# Patient Record
Sex: FEMALE | Race: WHITE | NOT HISPANIC OR LATINO | Employment: OTHER | URBAN - METROPOLITAN AREA
[De-identification: names, ages, dates, MRNs, and addresses within clinical notes are randomized per-mention and may not be internally consistent; named-entity substitution may affect disease eponyms.]

---

## 2023-08-13 ENCOUNTER — ANCILLARY PROCEDURE (OUTPATIENT)
Dept: GENERAL RADIOLOGY | Facility: CLINIC | Age: 63
End: 2023-08-13
Attending: NURSE PRACTITIONER
Payer: MEDICARE

## 2023-08-13 ENCOUNTER — OFFICE VISIT (OUTPATIENT)
Dept: URGENT CARE | Facility: URGENT CARE | Age: 63
End: 2023-08-13
Payer: MEDICARE

## 2023-08-13 VITALS
DIASTOLIC BLOOD PRESSURE: 73 MMHG | TEMPERATURE: 98.1 F | WEIGHT: 115 LBS | SYSTOLIC BLOOD PRESSURE: 108 MMHG | HEART RATE: 76 BPM | OXYGEN SATURATION: 100 %

## 2023-08-13 DIAGNOSIS — S69.91XA WRIST INJURY, RIGHT, INITIAL ENCOUNTER: ICD-10-CM

## 2023-08-13 DIAGNOSIS — S59.901A ELBOW INJURY, RIGHT, INITIAL ENCOUNTER: ICD-10-CM

## 2023-08-13 DIAGNOSIS — S52.124A CLOSED NONDISPLACED FRACTURE OF HEAD OF RIGHT RADIUS, INITIAL ENCOUNTER: Primary | ICD-10-CM

## 2023-08-13 PROCEDURE — 73080 X-RAY EXAM OF ELBOW: CPT | Mod: TC | Performed by: RADIOLOGY

## 2023-08-13 PROCEDURE — 99204 OFFICE O/P NEW MOD 45 MIN: CPT | Performed by: NURSE PRACTITIONER

## 2023-08-13 PROCEDURE — 73110 X-RAY EXAM OF WRIST: CPT | Mod: TC | Performed by: RADIOLOGY

## 2023-08-13 RX ORDER — ATORVASTATIN CALCIUM 20 MG/1
TABLET, FILM COATED ORAL
COMMUNITY
Start: 2022-08-01 | End: 2023-08-23

## 2023-08-14 ENCOUNTER — TELEPHONE (OUTPATIENT)
Dept: OTHER | Age: 63
End: 2023-08-14

## 2023-08-14 NOTE — TELEPHONE ENCOUNTER
SUBJECT:  RED FLAG FRACTURE TRIAGE NEEDED    Hello,  I'm with ortho con.  Pt has an arm fracture.  Should she see surgical or Nonsurgical.  She is open to [place].   Closed nondisplaced fracture of head of right radius .  Pt has had XR.  Please call the pt.

## 2023-08-16 DIAGNOSIS — M25.529 ELBOW PAIN: Primary | ICD-10-CM

## 2023-08-18 NOTE — TELEPHONE ENCOUNTER
DIAGNOSIS: Closed nondisplaced fracture of head of right radius/ Iwona Dee NP/ XR/ Medicare and / ortho con-Okay to use same day spot per TE    APPOINTMENT DATE: 08/21/23   NOTES STATUS DETAILS   DISCHARGE SUMMARY from hospital Internal 08/13/23 - German Dee, MELLY - [place] [facility]   MEDICATION LIST Internal    XRAYS (IMAGES & REPORTS) Internal XR R Elbow, XR W Wrist:  - 08/13/23 - German Dee, NP - [place]

## 2023-08-21 ENCOUNTER — PRE VISIT (OUTPATIENT)
Dept: ORTHOPEDICS | Facility: CLINIC | Age: 63
End: 2023-08-21

## 2023-08-21 ENCOUNTER — ANCILLARY PROCEDURE (OUTPATIENT)
Dept: GENERAL RADIOLOGY | Facility: CLINIC | Age: 63
End: 2023-08-21
Attending: FAMILY MEDICINE
Payer: MEDICARE

## 2023-08-21 ENCOUNTER — OFFICE VISIT (OUTPATIENT)
Dept: ORTHOPEDICS | Facility: CLINIC | Age: 63
End: 2023-08-21
Payer: MEDICARE

## 2023-08-21 DIAGNOSIS — M25.421 ELBOW EFFUSION, RIGHT: Primary | ICD-10-CM

## 2023-08-21 DIAGNOSIS — M25.529 ELBOW PAIN: ICD-10-CM

## 2023-08-21 DIAGNOSIS — S52.124A CLOSED NONDISPLACED FRACTURE OF HEAD OF RIGHT RADIUS, INITIAL ENCOUNTER: ICD-10-CM

## 2023-08-21 PROCEDURE — 73080 X-RAY EXAM OF ELBOW: CPT | Mod: RT | Performed by: RADIOLOGY

## 2023-08-21 PROCEDURE — 99203 OFFICE O/P NEW LOW 30 MIN: CPT | Performed by: FAMILY MEDICINE

## 2023-08-21 NOTE — PROGRESS NOTES
Patient evaluated in urgent care 8/13/2023, after a fall onto an outstretched right arm that occurred 2 days prior, 8/11/2023, ten days ago.  X-ray of the right wrist and elbow were negative for fracture but left elbow showed an intra-articular effusion suggestive of the possibility of an occult radial head fracture.  Patient was given a sling and told to follow-up with orthopedics. She is alternating between ibuprofen and Tylenol for her pain. She has remained in her sling. She is right hand dominant.         Imaging studies below reviewed by me:  EXAM: XR ELBOW RIGHT G/E 3 VIEWS  LOCATION: [facility]  DATE: 8/13/2023     INDICATION: FOOSH with pain bruising limited range of motion  COMPARISON: None.                                                                      IMPRESSION: No fracture or dislocation. Moderate-sized joint effusion.      EXAM: XR WRIST RIGHT G/E 3 VIEWS  LOCATION: [facility]  DATE: 8/13/2023     INDICATION: FOOSH with pain bruising limited range of motion  COMPARISON: None.                                                                      IMPRESSION: No fracture. Mild ulnar minus variance. Normal carpal alignment.        PMH:  hypercholesterolemia    Active problem list:  There is no problem list on file for this patient.      FH:  No family history on file.    SH:  Social History     Socioeconomic History    Marital status:      Spouse name: Not on file    Number of children: Not on file    Years of education: Not on file    Highest education level: Not on file   Occupational History    Not on file   Tobacco Use    Smoking status: Never     Passive exposure: Never    Smokeless tobacco: Never   Substance and Sexual Activity    Alcohol use: Not on file    Drug use: Not on file    Sexual activity: Not on file   Other Topics Concern    Not on file   Social History Narrative    Not on file     Social Determinants of Health      Financial Resource Strain: Not on file   Food Insecurity: Not on file   Transportation Needs: Not on file   Physical Activity: Not on file   Stress: Not on file   Social Connections: Not on file   Intimate Partner Violence: Not on file   Housing Stability: Not on file       MEDS:  See EMR, reviewed    atorvastatin (LIPITOR) 20 MG tablet,     ALL:  See EMR, reviewed    REVIEW OF SYSTEMS:  CONSTITUTIONAL:NEGATIVE for fever, chills, change in weight  INTEGUMENTARY/SKIN: NEGATIVE for worrisome rashes, moles or lesions  EYES: NEGATIVE for vision changes or irritation  ENT/MOUTH: NEGATIVE for ear, mouth and throat problems  RESP:NEGATIVE for significant cough or SOB  BREAST: NEGATIVE for masses, tenderness or discharge  CV: NEGATIVE for chest pain, palpitations or peripheral edema  GI: NEGATIVE for nausea, abdominal pain, heartburn, or change in bowel habits  :NEGATIVE for frequency, dysuria, or hematuria  :NEGATIVE for frequency, dysuria, or hematuria  NEURO: NEGATIVE for weakness, dizziness or paresthesias  ENDOCRINE: NEGATIVE for temperature intolerance, skin/hair changes  HEME/ALLERGY/IMMUNE: NEGATIVE for bleeding problems  PSYCHIATRIC: NEGATIVE for changes in mood or affect          Objective: She has full strength at the right shoulder with no signs of rotator cuff weakness.  She has no tenderness at the right wrist at the distal radius, distal ulna, scaphoid or lunate.  Grasp strength is strong in the right hand.  Sensation is normal distally.  She is tender at the proximal radius of the elbow.  She can flex and extend the elbow through full range of motion.  Supination and pronation are tolerated with discomfort at the extremes of motion.  Appropriate in conversation and affect.    I personally reviewed with the patient updated x-rays of the elbow that show signs of an elbow effusion but no obvious radial head fracture.  Tiny marzena calcification at the tip of the coronoid process, but not diagnostic for  fracture.      Assessment: Acute right-sided elbow injury suspect occult radial head fracture, x10 days    Plan: Sling as needed for comfort.  We discussed removing the sling each day for range of motion through flexion and extension.  After 2 weeks she can try range of motion additionally in pronation and supination.  She will avoid direct weightbearing, pushing and pulling with the right elbow over the next 3 weeks.  Tylenol as needed for pain.  Follow-up x-ray of the right elbow and clinical exam in 3 weeks.

## 2023-08-23 ENCOUNTER — OFFICE VISIT (OUTPATIENT)
Dept: FAMILY MEDICINE | Facility: CLINIC | Age: 63
End: 2023-08-23
Payer: MEDICARE

## 2023-08-23 ENCOUNTER — LAB (OUTPATIENT)
Dept: FAMILY MEDICINE | Facility: CLINIC | Age: 63
End: 2023-08-23

## 2023-08-23 VITALS
WEIGHT: 113 LBS | DIASTOLIC BLOOD PRESSURE: 67 MMHG | TEMPERATURE: 97.6 F | HEIGHT: 62 IN | OXYGEN SATURATION: 98 % | BODY MASS INDEX: 20.8 KG/M2 | SYSTOLIC BLOOD PRESSURE: 99 MMHG | RESPIRATION RATE: 15 BRPM | HEART RATE: 76 BPM

## 2023-08-23 DIAGNOSIS — Z12.11 SCREEN FOR COLON CANCER: ICD-10-CM

## 2023-08-23 DIAGNOSIS — Z11.4 SCREENING FOR HIV (HUMAN IMMUNODEFICIENCY VIRUS): ICD-10-CM

## 2023-08-23 DIAGNOSIS — Z00.00 ENCOUNTER FOR MEDICARE ANNUAL WELLNESS EXAM: Primary | ICD-10-CM

## 2023-08-23 DIAGNOSIS — Z23 ENCOUNTER FOR IMMUNIZATION: ICD-10-CM

## 2023-08-23 DIAGNOSIS — Z86.73 H/O: CVA (CEREBROVASCULAR ACCIDENT): ICD-10-CM

## 2023-08-23 DIAGNOSIS — R29.898 WEAKNESS OF RIGHT HIP: ICD-10-CM

## 2023-08-23 PROBLEM — I63.9 CVA (CEREBRAL VASCULAR ACCIDENT) (H): Status: ACTIVE | Noted: 2019-08-14

## 2023-08-23 PROBLEM — M81.0 OSTEOPOROSIS: Status: ACTIVE | Noted: 2017-03-23

## 2023-08-23 LAB
ANION GAP SERPL CALCULATED.3IONS-SCNC: 10 MMOL/L (ref 7–15)
BUN SERPL-MCNC: 11.1 MG/DL (ref 8–23)
CALCIUM SERPL-MCNC: 9.6 MG/DL (ref 8.8–10.2)
CHLORIDE SERPL-SCNC: 105 MMOL/L (ref 98–107)
CHOLEST SERPL-MCNC: 165 MG/DL
CREAT SERPL-MCNC: 0.64 MG/DL (ref 0.51–0.95)
DEPRECATED HCO3 PLAS-SCNC: 26 MMOL/L (ref 22–29)
ERYTHROCYTE [DISTWIDTH] IN BLOOD BY AUTOMATED COUNT: 13.7 % (ref 10–15)
GFR SERPL CREATININE-BSD FRML MDRD: >90 ML/MIN/1.73M2
GLUCOSE SERPL-MCNC: 92 MG/DL (ref 70–99)
HCT VFR BLD AUTO: 38.7 % (ref 35–47)
HDLC SERPL-MCNC: 79 MG/DL
HGB BLD-MCNC: 12.4 G/DL (ref 11.7–15.7)
LDLC SERPL CALC-MCNC: 73 MG/DL
MCH RBC QN AUTO: 30 PG (ref 26.5–33)
MCHC RBC AUTO-ENTMCNC: 32 G/DL (ref 31.5–36.5)
MCV RBC AUTO: 94 FL (ref 78–100)
NONHDLC SERPL-MCNC: 86 MG/DL
PLATELET # BLD AUTO: 268 10E3/UL (ref 150–450)
POTASSIUM SERPL-SCNC: 4.7 MMOL/L (ref 3.4–5.3)
RBC # BLD AUTO: 4.13 10E6/UL (ref 3.8–5.2)
SODIUM SERPL-SCNC: 141 MMOL/L (ref 136–145)
TRIGL SERPL-MCNC: 66 MG/DL
WBC # BLD AUTO: 5.4 10E3/UL (ref 4–11)

## 2023-08-23 PROCEDURE — 90677 PCV20 VACCINE IM: CPT | Performed by: PHYSICIAN ASSISTANT

## 2023-08-23 PROCEDURE — 36415 COLL VENOUS BLD VENIPUNCTURE: CPT | Performed by: PHYSICIAN ASSISTANT

## 2023-08-23 PROCEDURE — G0009 ADMIN PNEUMOCOCCAL VACCINE: HCPCS | Performed by: PHYSICIAN ASSISTANT

## 2023-08-23 PROCEDURE — 85027 COMPLETE CBC AUTOMATED: CPT | Performed by: PHYSICIAN ASSISTANT

## 2023-08-23 PROCEDURE — 80061 LIPID PANEL: CPT | Performed by: PHYSICIAN ASSISTANT

## 2023-08-23 PROCEDURE — 80048 BASIC METABOLIC PNL TOTAL CA: CPT | Performed by: PHYSICIAN ASSISTANT

## 2023-08-23 PROCEDURE — 87389 HIV-1 AG W/HIV-1&-2 AB AG IA: CPT | Performed by: PHYSICIAN ASSISTANT

## 2023-08-23 PROCEDURE — 99213 OFFICE O/P EST LOW 20 MIN: CPT | Mod: 25 | Performed by: PHYSICIAN ASSISTANT

## 2023-08-23 PROCEDURE — G0402 INITIAL PREVENTIVE EXAM: HCPCS | Performed by: PHYSICIAN ASSISTANT

## 2023-08-23 RX ORDER — ATORVASTATIN CALCIUM 20 MG/1
20 TABLET, FILM COATED ORAL DAILY
Qty: 90 TABLET | Refills: 3 | Status: SHIPPED | OUTPATIENT
Start: 2023-08-23 | End: 2024-02-12

## 2023-08-23 ASSESSMENT — ENCOUNTER SYMPTOMS
FEVER: 0
MYALGIAS: 0
JOINT SWELLING: 0
DYSURIA: 0
NAUSEA: 0
DIZZINESS: 0
SHORTNESS OF BREATH: 0
HEARTBURN: 0
PALPITATIONS: 0
CONSTIPATION: 0
HEMATOCHEZIA: 0
EYE PAIN: 0
ABDOMINAL PAIN: 0
HEMATURIA: 0
CHILLS: 0
SORE THROAT: 0
ARTHRALGIAS: 0
COUGH: 0
PARESTHESIAS: 0
FREQUENCY: 0
DIARRHEA: 0
WEAKNESS: 0
BREAST MASS: 0
NERVOUS/ANXIOUS: 0
HEADACHES: 0

## 2023-08-23 ASSESSMENT — ACTIVITIES OF DAILY LIVING (ADL): CURRENT_FUNCTION: NO ASSISTANCE NEEDED

## 2023-08-23 ASSESSMENT — PAIN SCALES - GENERAL: PAINLEVEL: NO PAIN (0)

## 2023-08-23 NOTE — PROGRESS NOTES
"SUBJECTIVE:   Gladis is a 63 year old who presents for Preventive Visit.      8/23/2023    10:51 AM   Additional Questions   Roomed by Bailee Soto     Are you in the first 12 months of your Medicare coverage?  Yes,  Visual Acuity:  Right Eye: 20/20   Left Eye: 20/20  Both Eyes: 20/20    Healthy Habits:     In general, how would you rate your overall health?  Good    Frequency of exercise:  4-5 days/week    Duration of exercise:  30-45 minutes    Do you usually eat at least 4 servings of fruit and vegetables a day, include whole grains    & fiber and avoid regularly eating high fat or \"junk\" foods?  Yes    Taking medications regularly:  Yes    Medication side effects:  None    Ability to successfully perform activities of daily living:  No assistance needed    Home Safety:  No safety concerns identified    Hearing Impairment:  No hearing concerns    In the past 6 months, have you been bothered by leaking of urine?  No    In general, how would you rate your overall mental or emotional health?  Good    Additional concerns today:  Yes    Gladis here today to establish care, first medicare wellness    Moved to [facility] to be closer to her kids and grandkids from [place]  Records available via care everywhere  Due for refills of atorvastatin    She does also mention some deep pain/catching of right hip with certain positional changes, ongoing for quite some time    Have you ever done Advance Care Planning? (For example, a Health Directive, POLST, or a discussion with a medical provider or your loved ones about your wishes): No, advance care planning information given to patient to review.  Patient declined advance care planning discussion at this time.     Fall risk  Fallen 2 or more times in the past year?: No  Any fall with injury in the past year?: Yes    Cognitive Screening   1) Repeat 3 items (Leader, Season, Table)    2) Clock draw: NORMAL  3) 3 item recall: Recalls 2 objects   Results: NORMAL clock, 1-2 items recalled: " COGNITIVE IMPAIRMENT LESS LIKELY    Mini-CogTM Copyright JEREMÍAS Estrada. Licensed by the author for use in [facility]; reprinted with permission (monae@.[facility]). All rights reserved.      Reviewed and updated as needed this visit by clinical staff   Tobacco  Allergies  Meds  Problems  Med Hx  Surg Hx  Fam Hx          Reviewed and updated as needed this visit by Provider   Tobacco   Meds  Problems  Med Hx  Surg Hx  Fam Hx         Social History     Tobacco Use    Smoking status: Never     Passive exposure: Never    Smokeless tobacco: Never   Substance Use Topics    Alcohol use: Not Currently           8/23/2023    10:08 AM   Alcohol Use   Prescreen: >3 drinks/day or >7 drinks/week? Not Applicable          No data to display              Do you have a current opioid prescription? No  Do you use any other controlled substances or medications that are not prescribed by a provider? None      Current providers sharing in care for this patient include:   Patient Care Team:  Bailee Sands PA-C as PCP - General (Physician Assistant - Medical)    The following health maintenance items are reviewed in Epic and correct as of today:  Health Maintenance   Topic Date Due    COLORECTAL CANCER SCREENING  Never done    HIV SCREENING  Never done    LIPID  Never done    ZOSTER IMMUNIZATION (1 of 2) Never done    COVID-19 Vaccine (4 - Pfizer series) 12/27/2021    MEDICARE ANNUAL WELLNESS VISIT  08/12/2023    INFLUENZA VACCINE (1) 09/01/2023    ANNUAL REVIEW OF HM ORDERS  08/23/2024    FALL RISK ASSESSMENT  08/23/2024    MAMMO SCREENING  11/30/2024    ADVANCE CARE PLANNING  08/23/2028    DTAP/TDAP/TD IMMUNIZATION (3 - Td or Tdap) 03/18/2029    DEXA  04/02/2034    PHQ-2 (once per calendar year)  Completed    Pneumococcal Vaccine: 65+ Years  Completed    IPV IMMUNIZATION  Aged Out    MENINGITIS IMMUNIZATION  Aged Out    HEPATITIS C SCREENING  Discontinued     FHS-7:       8/23/2023    10:09 AM   Breast CA Risk Assessment  "(FHS-7)   Did any of your first-degree relatives have breast or ovarian cancer? Yes   Did any of your relatives have bilateral breast cancer? No   Did any man in your family have breast cancer? No   Did any woman in your family have breast and ovarian cancer? Yes   Did any woman in your family have breast cancer before age 50 y? No   Do you have 2 or more relatives with breast and/or ovarian cancer? No   Do you have 2 or more relatives with breast and/or bowel cancer? No     Mammogram Screening: Recommended mammography every 1-2 years with patient discussion and risk factor consideration  Pertinent mammograms are reviewed under the imaging tab.    Review of Systems   Constitutional:  Negative for chills and fever.   HENT:  Negative for congestion, ear pain, hearing loss and sore throat.    Eyes:  Negative for pain and visual disturbance.   Respiratory:  Negative for cough and shortness of breath.    Cardiovascular:  Negative for chest pain, palpitations and peripheral edema.   Gastrointestinal:  Negative for abdominal pain, constipation, diarrhea, heartburn, hematochezia and nausea.   Breasts:  Negative for tenderness, breast mass and discharge.   Genitourinary:  Negative for dysuria, frequency, genital sores, hematuria, pelvic pain, urgency, vaginal bleeding and vaginal discharge.   Musculoskeletal:  Negative for arthralgias, joint swelling and myalgias.   Skin:  Negative for rash.   Neurological:  Negative for dizziness, weakness, headaches and paresthesias.   Psychiatric/Behavioral:  Positive for mood changes. The patient is not nervous/anxious.        OBJECTIVE:   BP 99/67 (BP Location: Right arm, Patient Position: Sitting, Cuff Size: Adult Regular)   Pulse 76   Temp 97.6  F (36.4  C) (Temporal)   Resp 15   Ht 1.562 m (5' 1.5\")   Wt 51.3 kg (113 lb)   SpO2 98%   BMI 21.01 kg/m   Estimated body mass index is 21.01 kg/m  as calculated from the following:    Height as of this encounter: 1.562 m (5' 1.5\").    " Weight as of this encounter: 51.3 kg (113 lb).  Physical Exam  GENERAL: healthy, alert and no distress  EYES: Eyes grossly normal to inspection, PERRL and conjunctivae and sclerae normal  HENT: ear canals and TM's normal, nose and mouth without ulcers or lesions  NECK: no adenopathy, no asymmetry, masses, or scars and thyroid normal to palpation  RESP: lungs clear to auscultation - no rales, rhonchi or wheezes  CV: regular rate and rhythm, normal S1 S2, no S3 or S4, no murmur, click or rub, no peripheral edema and peripheral pulses strong  ABDOMEN: soft, nontender, no hepatosplenomegaly, no masses and bowel sounds normal  MS: no gross musculoskeletal defects noted, no edema  SKIN: no suspicious lesions or rashes  NEURO: Normal strength and tone, mentation intact and speech normal  PSYCH: mentation appears normal, affect normal/bright      ASSESSMENT / PLAN:   Gladis was seen today for annual visit.    Diagnoses and all orders for this visit:    Encounter for Medicare annual wellness exam  -     REVIEW OF HEALTH MAINTENANCE PROTOCOL ORDERS  -     CBC with platelets; Future  -     Basic metabolic panel; Future  -     Lipid panel reflex to direct LDL Non-fasting; Future  -     CBC with platelets  -     Basic metabolic panel  -     Lipid panel reflex to direct LDL Non-fasting    Weakness of right hip - recommend PT, she is agreeable, referral provided  -     Physical Therapy Referral; Future    H/O: CVA (cerebrovascular accident) - refills sent  -     atorvastatin (LIPITOR) 20 MG tablet; Take 1 tablet (20 mg) by mouth daily    Screen for colon cancer  -     COLOGUARD(EXACT SCIENCES); Future    Screening for HIV (human immunodeficiency virus)  -     HIV Antigen Antibody Combo; Future  -     HIV Antigen Antibody Combo    Encounter for immunization  -     Pneumococcal 20 Valent Conjugate (PCV20)    Other orders  -     Cancel: MA SCREENING DIGITAL BILAT - Future  (s+30); Future      COUNSELING:  Reviewed preventive health  counseling, as reflected in patient instructions        She reports that she has never smoked. She has never been exposed to tobacco smoke. She has never used smokeless tobacco.      Appropriate preventive services were discussed with this patient, including applicable screening as appropriate for cardiovascular disease, diabetes, osteopenia/osteoporosis, and glaucoma.  As appropriate for age/gender, discussed screening for colorectal cancer, prostate cancer, breast cancer, and cervical cancer. Checklist reviewing preventive services available has been given to the patient.    Reviewed patients plan of care and provided an AVS. The Basic Care Plan (routine screening as documented in Health Maintenance) for Gladis meets the Care Plan requirement. This Care Plan has been established and reviewed with the Patient.          Bailee Sands PA-C  [facility]    Identified Health Risks:  I have reviewed Opioid Use Disorder and Substance Use Disorder risk factors and made any needed referrals.

## 2023-08-23 NOTE — NURSING NOTE
Prior to immunization administration, verified patients identity using patient s name and date of birth. Please see Immunization Activity for additional information.     Screening Questionnaire for Adult Immunization    Are you sick today?   No   Do you have allergies to medications, food, a vaccine component or latex?   Yes   Have you ever had a serious reaction after receiving a vaccination?   No   Do you have a long-term health problem with heart, lung, kidney, or metabolic disease (e.g., diabetes), asthma, a blood disorder, no spleen, complement component deficiency, a cochlear implant, or a spinal fluid leak?  Are you on long-term aspirin therapy?   No   Do you have cancer, leukemia, HIV/AIDS, or any other immune system problem?   No   Do you have a parent, brother, or sister with an immune system problem?   No   In the past 3 months, have you taken medications that affect  your immune system, such as prednisone, other steroids, or anticancer drugs; drugs for the treatment of rheumatoid arthritis, Crohn s disease, or psoriasis; or have you had radiation treatments?   No   Have you had a seizure, or a brain or other nervous system problem?   No   During the past year, have you received a transfusion of blood or blood    products, or been given immune (gamma) globulin or antiviral drug?   No   For women: Are you pregnant or is there a chance you could become       pregnant during the next month?   No   Have you received any vaccinations in the past 4 weeks?   No     Immunization questionnaire was positive for at least one answer.  Notified miguel.      Patient instructed to remain in clinic for 15 minutes afterwards, and to report any adverse reactions.     Screening performed by Marry Toney MA on 8/23/2023 at 11:31 AM.

## 2023-08-24 LAB — HIV 1+2 AB+HIV1 P24 AG SERPL QL IA: NONREACTIVE

## 2023-09-06 DIAGNOSIS — M25.529 ELBOW PAIN: Primary | ICD-10-CM

## 2023-09-10 ASSESSMENT — ACTIVITIES OF DAILY LIVING (ADL)
STEPPING UP AND DOWN CURBS: SLIGHT DIFFICULTY
HOS_ADL_HIGHEST_POTENTIAL_SCORE: 48
ROLLING OVER IN BED: SLIGHT DIFFICULTY
HOW_WOULD_YOU_RATE_YOUR_CURRENT_LEVEL_OF_FUNCTION_DURING_YOUR_USUAL_ACTIVITIES_OF_DAILY_LIVING_FROM_0_TO_100_WITH_100_BEING_YOUR_LEVEL_OF_FUNCTION_PRIOR_TO_YOUR_HIP_PROBLEM_AND_0_BEING_THE_INABILITY_TO_PERFORM_ANY_OF_YOUR_USUAL_DAILY_ACTIVITIES?: 90
HEAVY_WORK: SLIGHT DIFFICULTY
TWISTING/PIVOTING ON INVOLVED LEG: MODERATE DIFFICULTY
HOS_ADL_ITEM_SCORE_TOTAL: 39
LIGHT_TO_MODERATE_WORK: SLIGHT DIFFICULTY
RECREATIONAL ACTIVITIES: SLIGHT DIFFICULTY
GOING UP 1 FLIGHT OF STAIRS: NO DIFFICULTY AT ALL
WALKING_UP_STEEP_HILLS: NO DIFFICULTY AT ALL
WALKING_DOWN_STEEP_HILLS: NO DIFFICULTY AT ALL
DEEP SQUATTING: SLIGHT DIFFICULTY
PUTTING ON SOCKS AND SHOES: NO DIFFICULTY AT ALL
STANDING FOR 15 MINUTES: NO DIFFICULTY AT ALL
GETTING INTO AND OUT OF AN AVERAGE CAR: SLIGHT DIFFICULTY

## 2023-09-11 ENCOUNTER — THERAPY VISIT (OUTPATIENT)
Dept: PHYSICAL THERAPY | Facility: CLINIC | Age: 63
End: 2023-09-11
Payer: MEDICARE

## 2023-09-11 DIAGNOSIS — R29.898 WEAKNESS OF RIGHT HIP: ICD-10-CM

## 2023-09-11 PROCEDURE — 97161 PT EVAL LOW COMPLEX 20 MIN: CPT | Mod: GP

## 2023-09-11 PROCEDURE — 97110 THERAPEUTIC EXERCISES: CPT | Mod: GP

## 2023-09-11 NOTE — PROGRESS NOTES
PHYSICAL THERAPY EVALUATION  Type of Visit: Evaluation    See electronic medical record for Abuse and Falls Screening details.    Subjective   1.5 years ago getting pain in anterolateral R hip. She notes tightness and swelling in R hip when walking. She notes she was squatting and doing yoga with benefit. She notes symptoms developed feelings that her leg will give out. She generally is without pain, but with certain stepping patterns such as getting out of her car, stepping up on a curb, or turning while walking. Nature of symptoms is intermittent (once every two weeks), sharp, catch, and non-radiating. 6/10 pain. She notes being more careful these days. She notes history of stroke on R side in 2019.       Presenting condition or subjective complaint: I have experienced sporadic pain when pivoting, stepping, or simply walking on my right side, causing me to feel like my leg is going to give out when I put pressure on it or move slightly .  Date of onset: 21 (Pain for 1.5 years - CVA 3 years ago)    Relevant medical history: Stroke   Dates & types of surgery:  section, 1984, 10/13/1986    Prior diagnostic imaging/testing results:       Prior therapy history for the same diagnosis, illness or injury: No      Living Environment  Social support: With a significant other or spouse   Type of home: 2-story   Stairs to enter the home: Yes 5 Is there a railing: No   Ramp:     Stairs inside the home: Yes   Is there a railing: Yes   Help at home: None  Equipment owned:       Employment: No    Hobbies/Interests: Walking, reading, cooking    Patient goals for therapy: Carry my grandchild safely up and down stairs, safely do squatting exercises and bend and reach for normal daily activities.       Objective   HIP EVALUATION  POSTURE: Standing Posture: Sway back  GAIT: Noted trendelenburg sign on R with increased R lateral shifting  ROM:   (Degrees) Left AROM Left PROM  Right AROM Right PROM   Hip Flexion  Minimal change  Minimal change    Hip Internal Rotation  25  18   Hip External Rotation  45  50   Lumbar Rotation Restricted with R hip tension    Lumbar Flexion Fingertips to top of toes   Lumbar Extension Stretch in R hip   Pain:   End feel:   STRENGTH:  Global R sided weakness compared to L sided (history of R side affected CVA)  PALPATION:  Tenderness to palpation of R hip flexors, R rectus femoris, R greater trochanter  JOINT MOBILITY: Decreased B hip IR     Assessment & Plan   CLINICAL IMPRESSIONS  Medical Diagnosis: Weakness of right hip    Treatment Diagnosis: R hip pain with strength and movement coordination impairments.   Impression/Assessment: Patient is a 63 year old female with R hip pain complaints.  The following significant findings have been identified: Pain, Decreased ROM/flexibility, Decreased joint mobility, Decreased strength, Impaired balance, Decreased proprioception, and Impaired gait. These impairments interfere with their ability to perform self care tasks, recreational activities, and household chores as compared to previous level of function.     Clinical Decision Making (Complexity):  Clinical Presentation: Stable/Uncomplicated  Clinical Presentation Rationale: based on medical and personal factors listed in PT evaluation  Clinical Decision Making (Complexity): Low complexity    PLAN OF CARE  Treatment Interventions:  Interventions: Gait Training, Manual Therapy, Neuromuscular Re-education, Therapeutic Activity, Therapeutic Exercise, Self-Care/Home Management    Long Term Goals     PT Goal 1  Goal Identifier: Lifting  Goal Description: Pt will demonstrate ability to carry 20 pounds for 50 feet with minimal to no symptoms in R hip to demonstrate tolerance for carrying grandkids without symptoms.  Rationale: to maximize safety and independence with performance of ADLs and functional tasks  Target Date: 12/10/23      Frequency of Treatment: 1x/week  Duration of Treatment: 12  weeks    Education Assessment:   Learner/Method: Patient;Demonstration;Pictures/Video;No Barriers to Learning    Risks and benefits of evaluation/treatment have been explained.   Patient/Family/caregiver agrees with Plan of Care.     Evaluation Time:     PT [place], Low Complexity Minutes (88008): 15     Signing Clinician: LOLA COSTA [facility]                                                                                   OUTPATIENT PHYSICAL THERAPY      PLAN OF TREATMENT FOR OUTPATIENT REHABILITATION   Patient's Last Name, First Name, Gladis Savage YOB: 1960   Provider's Name   [facility]   Medical Record No.  [id number]     Onset Date: 09/11/23 (Pain for 1.5 years - CVA 3 years ago)  Start of Care Date: 09/11/23     Medical Diagnosis:  Weakness of right hip      PT Treatment Diagnosis:  R hip pain with strength and movement coordination impairments. Plan of Treatment  Frequency/Duration: 1x/week/ 12 weeks    Certification date from 09/11/23 to 12/10/23         See note for plan of treatment details and functional goals     LOLA DORAN                         I CERTIFY THE NEED FOR THESE SERVICES FURNISHED UNDER        THIS PLAN OF TREATMENT AND WHILE UNDER MY CARE     (Physician attestation of this document indicates review and certification of the therapy plan).                Referring Provider:  Bailee Sands      Initial Assessment  See Epic Evaluation- Start of Care Date: 09/11/23

## 2023-09-13 ENCOUNTER — ANCILLARY PROCEDURE (OUTPATIENT)
Dept: GENERAL RADIOLOGY | Facility: CLINIC | Age: 63
End: 2023-09-13
Attending: FAMILY MEDICINE
Payer: MEDICARE

## 2023-09-13 ENCOUNTER — OFFICE VISIT (OUTPATIENT)
Dept: ORTHOPEDICS | Facility: CLINIC | Age: 63
End: 2023-09-13
Payer: MEDICARE

## 2023-09-13 DIAGNOSIS — M25.529 ELBOW PAIN: ICD-10-CM

## 2023-09-13 DIAGNOSIS — S52.124D: Primary | ICD-10-CM

## 2023-09-13 PROCEDURE — 99213 OFFICE O/P EST LOW 20 MIN: CPT | Performed by: FAMILY MEDICINE

## 2023-09-13 PROCEDURE — 73080 X-RAY EXAM OF ELBOW: CPT | Mod: RT | Performed by: RADIOLOGY

## 2023-09-13 NOTE — LETTER
9/13/2023      RE: Gladis Dunaway  [address] [place] [address]     Dear Colleague,    Thank you for referring your patient, Gladis Dunaway, to the [facility] [place]. Please see a copy of my visit note below.    Follow-up right-sided elbow injury with suspected occult right radial head fracture, negative x-rays, that occurred 1 month ago.    Patient indicates that since her last visit her pain is resolved.  She does not notice discomfort with movements of the elbow.    Date of injury: Patient fell onto outstretched right upper extremity 8/11/2023.   elbow xray showed an intra-articular effusion suggestive of the possibility of an occult radial head fracture .    Today, the patient reports that she has improved since her last visit. She denies any current pain. 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     Exam: 4 views of the right elbow dated 8/21/2023.     COMPARISON: 8/13/2023.     CLINICAL HISTORY: Pain.     FINDINGS: AP, oblique, and lateral views of the right elbow were  obtained. Joint effusion. No displaced fracture. Slight irregularity  at the radial head and neck junction.                                                                      IMPRESSION: Slight irregularity at the radial head and neck junction,  correlate clinically for a nondisplaced fracture.     JANNET GODOY MD         EXAM: XR WRIST RIGHT G/E 3 VIEWS  LOCATION: [facility]  DATE: 8/13/2023     INDICATION: FOOSH with pain bruising limited range of motion  COMPARISON: None.                                                                      IMPRESSION: No fracture. Mild ulnar minus variance. Normal carpal alignment.        PMH:  Past Medical History:   Diagnosis Date    Cerebral infarction (H) August 2019    Osteoporosis        Active problem list:  Patient Active Problem List   Diagnosis    Allergic rhinitis    H/O: CVA (cerebrovascular accident)     Hypercholesterolemia    Osteoporosis    Perianal abscess    Weakness of right hip       FH:  Family History   Problem Relation Age of Onset    Breast Cancer Mother         dx in 60's    Anxiety Disorder Mother     Coronary Artery Disease Father     Hyperlipidemia Father        SH:  Social History     Socioeconomic History    Marital status:      Spouse name: Not on file    Number of children: Not on file    Years of education: Not on file    Highest education level: Not on file   Occupational History    Not on file   Tobacco Use    Smoking status: Never     Passive exposure: Never    Smokeless tobacco: Never   Vaping Use    Vaping Use: Never used   Substance and Sexual Activity    Alcohol use: Not Currently    Drug use: Never    Sexual activity: Not Currently     Partners: Male     Birth control/protection: Post-menopausal   Other Topics Concern    Parent/sibling w/ CABG, MI or angioplasty before 65F 55M? No   Social History Narrative    Not on file     Social Determinants of Health     Financial Resource Strain: Not on file   Food Insecurity: Not on file   Transportation Needs: Not on file   Physical Activity: Not on file   Stress: Not on file   Social Connections: Not on file   Intimate Partner Violence: Not on file   Housing Stability: Not on file       MEDS:  See EMR, reviewed  ALL:  See EMR, reviewed    REVIEW OF SYSTEMS:  CONSTITUTIONAL:NEGATIVE for fever, chills, change in weight  INTEGUMENTARY/SKIN: NEGATIVE for worrisome rashes, moles or lesions  EYES: NEGATIVE for vision changes or irritation  ENT/MOUTH: NEGATIVE for ear, mouth and throat problems  RESP:NEGATIVE for significant cough or SOB  BREAST: NEGATIVE for masses, tenderness or discharge  CV: NEGATIVE for chest pain, palpitations or peripheral edema  GI: NEGATIVE for nausea, abdominal pain, heartburn, or change in bowel habits  :NEGATIVE for frequency, dysuria, or hematuria  :NEGATIVE for frequency, dysuria, or hematuria  NEURO: NEGATIVE  for weakness, dizziness or paresthesias  ENDOCRINE: NEGATIVE for temperature intolerance, skin/hair changes  HEME/ALLERGY/IMMUNE: NEGATIVE for bleeding problems  PSYCHIATRIC: NEGATIVE for changes in mood or affect        Objective: She has full range of motion of the right elbow to flexion and extension, and full range of motion to supination and pronation, without pain.  She seems nontender over the radial head compared to her nonaffected arm.  Grasp strength is strong.  Sensation is normal.  Overlying skin is normal.  Appropriate in conversation and affect.    I personally reviewed with the patient updated x-rays of the right proximal radial head that show ongoing healing.  Radial neck fracture is noted.  Without intra-articular involvement.    Assessment: Right-sided radial head fracture, healing    Plan: For the next 2 weeks the patient will avoid direct axial load weightbearing on the right upper extremity.  She will look for continued improvements over the next month.  She will follow-up if not improved 4 weeks from now.        Again, thank you for allowing me to participate in the care of your patient.      Sincerely,    Sonu Pringle MD

## 2023-09-13 NOTE — PROGRESS NOTES
Follow-up right-sided elbow injury with suspected occult right radial head fracture, negative x-rays, that occurred 1 month ago.    Patient indicates that since her last visit her pain is resolved.  She does not notice discomfort with movements of the elbow.    Date of injury: Patient fell onto outstretched right upper extremity 8/11/2023.   elbow xray showed an intra-articular effusion suggestive of the possibility of an occult radial head fracture .    Today, the patient reports that she has improved since her last visit. She denies any current pain. 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     Exam: 4 views of the right elbow dated 8/21/2023.     COMPARISON: 8/13/2023.     CLINICAL HISTORY: Pain.     FINDINGS: AP, oblique, and lateral views of the right elbow were  obtained. Joint effusion. No displaced fracture. Slight irregularity  at the radial head and neck junction.                                                                      IMPRESSION: Slight irregularity at the radial head and neck junction,  correlate clinically for a nondisplaced fracture.     JANNET GODOY MD         EXAM: XR WRIST RIGHT G/E 3 VIEWS  LOCATION: [facility]  DATE: 8/13/2023     INDICATION: FOOSH with pain bruising limited range of motion  COMPARISON: None.                                                                      IMPRESSION: No fracture. Mild ulnar minus variance. Normal carpal alignment.        PMH:  Past Medical History:   Diagnosis Date    Cerebral infarction (H) August 2019    Osteoporosis        Active problem list:  Patient Active Problem List   Diagnosis    Allergic rhinitis    H/O: CVA (cerebrovascular accident)    Hypercholesterolemia    Osteoporosis    Perianal abscess    Weakness of right hip       FH:  Family History   Problem Relation Age of Onset    Breast Cancer Mother         dx in 60's    Anxiety Disorder Mother     Coronary Artery Disease Father      Hyperlipidemia Father        SH:  Social History     Socioeconomic History    Marital status:      Spouse name: Not on file    Number of children: Not on file    Years of education: Not on file    Highest education level: Not on file   Occupational History    Not on file   Tobacco Use    Smoking status: Never     Passive exposure: Never    Smokeless tobacco: Never   Vaping Use    Vaping Use: Never used   Substance and Sexual Activity    Alcohol use: Not Currently    Drug use: Never    Sexual activity: Not Currently     Partners: Male     Birth control/protection: Post-menopausal   Other Topics Concern    Parent/sibling w/ CABG, MI or angioplasty before 65F 55M? No   Social History Narrative    Not on file     Social Determinants of Health     Financial Resource Strain: Not on file   Food Insecurity: Not on file   Transportation Needs: Not on file   Physical Activity: Not on file   Stress: Not on file   Social Connections: Not on file   Intimate Partner Violence: Not on file   Housing Stability: Not on file       MEDS:  See EMR, reviewed  ALL:  See EMR, reviewed    REVIEW OF SYSTEMS:  CONSTITUTIONAL:NEGATIVE for fever, chills, change in weight  INTEGUMENTARY/SKIN: NEGATIVE for worrisome rashes, moles or lesions  EYES: NEGATIVE for vision changes or irritation  ENT/MOUTH: NEGATIVE for ear, mouth and throat problems  RESP:NEGATIVE for significant cough or SOB  BREAST: NEGATIVE for masses, tenderness or discharge  CV: NEGATIVE for chest pain, palpitations or peripheral edema  GI: NEGATIVE for nausea, abdominal pain, heartburn, or change in bowel habits  :NEGATIVE for frequency, dysuria, or hematuria  :NEGATIVE for frequency, dysuria, or hematuria  NEURO: NEGATIVE for weakness, dizziness or paresthesias  ENDOCRINE: NEGATIVE for temperature intolerance, skin/hair changes  HEME/ALLERGY/IMMUNE: NEGATIVE for bleeding problems  PSYCHIATRIC: NEGATIVE for changes in mood or affect        Objective: She has full range  of motion of the right elbow to flexion and extension, and full range of motion to supination and pronation, without pain.  She seems nontender over the radial head compared to her nonaffected arm.  Grasp strength is strong.  Sensation is normal.  Overlying skin is normal.  Appropriate in conversation and affect.    I personally reviewed with the patient updated x-rays of the right proximal radial head that show ongoing healing.  Radial neck fracture is noted.  Without intra-articular involvement.    Assessment: Right-sided radial head fracture, healing    Plan: For the next 2 weeks the patient will avoid direct axial load weightbearing on the right upper extremity.  She will look for continued improvements over the next month.  She will follow-up if not improved 4 weeks from now.

## 2023-09-21 LAB — NONINV COLON CA DNA+OCC BLD SCRN STL QL: NEGATIVE

## 2023-09-25 ENCOUNTER — THERAPY VISIT (OUTPATIENT)
Dept: PHYSICAL THERAPY | Facility: CLINIC | Age: 63
End: 2023-09-25
Attending: PHYSICIAN ASSISTANT
Payer: MEDICARE

## 2023-09-25 DIAGNOSIS — R29.898 WEAKNESS OF RIGHT HIP: Primary | ICD-10-CM

## 2023-09-25 PROCEDURE — 97110 THERAPEUTIC EXERCISES: CPT | Mod: GP | Performed by: PHYSICAL THERAPIST

## 2023-09-25 PROCEDURE — 97140 MANUAL THERAPY 1/> REGIONS: CPT | Mod: GP | Performed by: PHYSICAL THERAPIST

## 2023-10-16 ENCOUNTER — THERAPY VISIT (OUTPATIENT)
Dept: PHYSICAL THERAPY | Facility: CLINIC | Age: 63
End: 2023-10-16
Payer: MEDICARE

## 2023-10-16 DIAGNOSIS — R29.898 WEAKNESS OF RIGHT HIP: Primary | ICD-10-CM

## 2023-10-16 PROCEDURE — 97140 MANUAL THERAPY 1/> REGIONS: CPT | Mod: GP | Performed by: PHYSICAL THERAPIST

## 2023-10-16 PROCEDURE — 97110 THERAPEUTIC EXERCISES: CPT | Mod: GP | Performed by: PHYSICAL THERAPIST

## 2023-10-21 ENCOUNTER — IMMUNIZATION (OUTPATIENT)
Dept: FAMILY MEDICINE | Facility: CLINIC | Age: 63
End: 2023-10-21
Payer: MEDICARE

## 2023-10-21 PROCEDURE — G0008 ADMIN INFLUENZA VIRUS VAC: HCPCS

## 2023-10-21 PROCEDURE — 90686 IIV4 VACC NO PRSV 0.5 ML IM: CPT

## 2024-01-14 ENCOUNTER — OFFICE VISIT (OUTPATIENT)
Dept: URGENT CARE | Facility: URGENT CARE | Age: 64
End: 2024-01-14
Payer: MEDICARE

## 2024-01-14 VITALS
DIASTOLIC BLOOD PRESSURE: 74 MMHG | BODY MASS INDEX: 21.16 KG/M2 | HEART RATE: 81 BPM | OXYGEN SATURATION: 98 % | TEMPERATURE: 97.3 F | WEIGHT: 115 LBS | HEIGHT: 62 IN | SYSTOLIC BLOOD PRESSURE: 106 MMHG | RESPIRATION RATE: 18 BRPM

## 2024-01-14 DIAGNOSIS — J01.10 ACUTE NON-RECURRENT FRONTAL SINUSITIS: Primary | ICD-10-CM

## 2024-01-14 PROCEDURE — 99203 OFFICE O/P NEW LOW 30 MIN: CPT | Performed by: INTERNAL MEDICINE

## 2024-01-14 RX ORDER — AZITHROMYCIN 250 MG/1
TABLET, FILM COATED ORAL
Qty: 6 TABLET | Refills: 0 | Status: SHIPPED | OUTPATIENT
Start: 2024-01-14 | End: 2024-01-19

## 2024-01-14 RX ORDER — FLUTICASONE PROPIONATE 50 MCG
1 SPRAY, SUSPENSION (ML) NASAL DAILY
Qty: 11 ML | Refills: 0 | Status: SHIPPED | OUTPATIENT
Start: 2024-01-14 | End: 2024-02-12

## 2024-01-14 RX ORDER — ASPIRIN 81 MG/1
81 TABLET, CHEWABLE ORAL DAILY
COMMUNITY

## 2024-01-14 NOTE — PROGRESS NOTES
"  Assessment & Plan     Acute non-recurrent frontal sinusitis  - azithromycin (ZITHROMAX) 250 MG tablet; Take 2 tablets (500 mg) by mouth daily for 1 day, THEN 1 tablet (250 mg) daily for 4 days.  - fluticasone (FLONASE) 50 MCG/ACT nasal spray; Spray 1 spray into both nostrils daily    Roman Espinoza MD  [facility] [place]    Moe Griffith is a 63 year old, presenting for the following health issues:  Urgent Care (Concern of sinus infection. Going on x 3 weeks, was getting better but worse this weekend. )    HPI   Has been experiencing on/off sinus pain over the past three weeks. Currently reporting a sense of pressure over the forehead and nasal congestion. Also noting some increase in \"labored breathing\" but isn't sure if this is related to nasal airflow obstruction.  Denies fevers/chills/sweats.      PMH: cerebrovascular disease (on atorvastatin and ASA)    Review of Systems   Constitutional, HEENT, cardiovascular, pulmonary, gi and gu systems are negative, except as otherwise noted.      Objective    /74   Pulse 81   Temp 97.3  F (36.3  C) (Temporal)   Resp 18   Ht 1.562 m (5' 1.5\")   Wt 52.2 kg (115 lb)   SpO2 98%   BMI 21.38 kg/m    Body mass index is 21.38 kg/m .  Physical Exam   GENERAL APPEARANCE: healthy, alert, and no distress  HENT: ear canals and TM's normal and cobblestoning of the posterior pharynx with post-nasal drainage   NECK: no adenopathy and no asymmetry, masses, or scars  RESP: lungs clear to auscultation - no rales, rhonchi or wheezes  CV: regular rates and rhythm, normal S1 S2, no S3 or S4, and no murmur, click or rub                "

## 2024-02-10 DIAGNOSIS — J01.10 ACUTE NON-RECURRENT FRONTAL SINUSITIS: ICD-10-CM

## 2024-02-12 ENCOUNTER — MYC REFILL (OUTPATIENT)
Dept: FAMILY MEDICINE | Facility: CLINIC | Age: 64
End: 2024-02-12
Payer: MEDICARE

## 2024-02-12 DIAGNOSIS — J01.10 ACUTE NON-RECURRENT FRONTAL SINUSITIS: ICD-10-CM

## 2024-02-12 DIAGNOSIS — Z86.73 H/O: CVA (CEREBROVASCULAR ACCIDENT): ICD-10-CM

## 2024-02-12 RX ORDER — FLUTICASONE PROPIONATE 50 MCG
1 SPRAY, SUSPENSION (ML) NASAL DAILY
Qty: 48 G | Refills: 0 | OUTPATIENT
Start: 2024-02-12

## 2024-02-12 RX ORDER — FLUTICASONE PROPIONATE 50 MCG
1 SPRAY, SUSPENSION (ML) NASAL DAILY
Qty: 48 G | Refills: 0 | Status: SHIPPED | OUTPATIENT
Start: 2024-02-12 | End: 2024-09-03

## 2024-02-13 RX ORDER — ATORVASTATIN CALCIUM 20 MG/1
20 TABLET, FILM COATED ORAL DAILY
Qty: 90 TABLET | Refills: 3 | Status: SHIPPED | OUTPATIENT
Start: 2024-02-13 | End: 2024-09-03

## 2024-04-23 ENCOUNTER — OFFICE VISIT (OUTPATIENT)
Dept: FAMILY MEDICINE | Facility: CLINIC | Age: 64
End: 2024-04-23
Payer: MEDICARE

## 2024-04-23 ENCOUNTER — ANCILLARY PROCEDURE (OUTPATIENT)
Dept: GENERAL RADIOLOGY | Facility: CLINIC | Age: 64
End: 2024-04-23
Attending: NURSE PRACTITIONER
Payer: MEDICARE

## 2024-04-23 VITALS
DIASTOLIC BLOOD PRESSURE: 70 MMHG | OXYGEN SATURATION: 98 % | WEIGHT: 114 LBS | RESPIRATION RATE: 16 BRPM | HEIGHT: 61 IN | SYSTOLIC BLOOD PRESSURE: 104 MMHG | TEMPERATURE: 97.3 F | HEART RATE: 69 BPM | BODY MASS INDEX: 21.52 KG/M2

## 2024-04-23 DIAGNOSIS — Z78.0 ASYMPTOMATIC POSTMENOPAUSAL STATUS: ICD-10-CM

## 2024-04-23 DIAGNOSIS — S76.912A STRAIN OF HIP AND THIGH, LEFT, INITIAL ENCOUNTER: Primary | ICD-10-CM

## 2024-04-23 DIAGNOSIS — S76.012A STRAIN OF HIP AND THIGH, LEFT, INITIAL ENCOUNTER: ICD-10-CM

## 2024-04-23 DIAGNOSIS — S76.012A STRAIN OF HIP AND THIGH, LEFT, INITIAL ENCOUNTER: Primary | ICD-10-CM

## 2024-04-23 DIAGNOSIS — S76.912A STRAIN OF HIP AND THIGH, LEFT, INITIAL ENCOUNTER: ICD-10-CM

## 2024-04-23 PROCEDURE — 73502 X-RAY EXAM HIP UNI 2-3 VIEWS: CPT | Mod: TC | Performed by: RADIOLOGY

## 2024-04-23 PROCEDURE — 99214 OFFICE O/P EST MOD 30 MIN: CPT | Performed by: NURSE PRACTITIONER

## 2024-04-23 RX ORDER — METHYLPREDNISOLONE 4 MG
TABLET, DOSE PACK ORAL
Qty: 21 TABLET | Refills: 0 | Status: SHIPPED | OUTPATIENT
Start: 2024-04-23 | End: 2024-09-03

## 2024-04-23 RX ORDER — RESPIRATORY SYNCYTIAL VIRUS VACCINE 120MCG/0.5
0.5 KIT INTRAMUSCULAR ONCE
Qty: 1 EACH | Refills: 0 | Status: CANCELLED | OUTPATIENT
Start: 2024-04-23 | End: 2024-04-23

## 2024-04-23 NOTE — PROGRESS NOTES
Assessment & Plan     Strain of hip and thigh, left, initial encounter  Failed Physical Therapy; imaging; referral to Orthopedics;   - XR Pelvis w Hip Right G/E 2 Views  - Orthopedic  Referral  - methylPREDNISolone (MEDROL DOSEPAK) 4 MG tablet therapy pack  Dispense: 21 tablet; Refill: 0    Asymptomatic postmenopausal status  - DX Bone Density                    Subjective   Gladis is a 64 year old, presenting for the following health issues:  Musculoskeletal Problem      4/23/2024     1:34 PM   Additional Questions   Roomed by Torsten     Musculoskeletal Problem  This is a recurrent problem. The current episode started more than 1 month ago. The problem occurs constantly. The symptoms are aggravated by bending. She has tried lying down, position changes, sleep and rest for the symptoms. The treatment provided moderate relief.   History of Present Illness       Reason for visit:  Hip pain  Symptom onset:  More than a month  Symptom intensity:  Severe  Symptom progression:  Worsening    She eats 4 or more servings of fruits and vegetables daily.She consumes 0 sweetened beverage(s) daily.She exercises with enough effort to increase her heart rate 10 to 19 minutes per day.  She exercises with enough effort to increase her heart rate 3 or less days per week.   She is taking medications regularly.     Chronic right groin 1 month ago pain radiating to buttock and down leg; pain with getting up and down from seated position; pain with ambulation;   Seen Physical Therapy in fall for chronic pain; mildly effective    # Osteopenia previously on fosmax for ~5 yrs  2011 Impression:   1. Bone mineral density of the lumbar spine measured from L1 through L4 is consistent with osteopenia. Fracture risk is moderate.   2. Bone mineral density of the left femoral neck is within normal limits. Fracture risk is low.     Last dexa 2019  Total hip   The lowest BMD is at the left total proximal femur and is 0.917 g/cm2 representing  "a T-score of -0.7.  This represents a 0.3% decrease from the prior examination.                     Objective    /70 (BP Location: Right arm, Patient Position: Sitting, Cuff Size: Adult Regular)   Pulse 69   Temp 97.3  F (36.3  C) (Temporal)   Resp 16   Ht 1.56 m (5' 1.42\")   Wt 51.7 kg (114 lb)   SpO2 98%   BMI 21.25 kg/m    Body mass index is 21.25 kg/m .  Physical Exam               Signed Electronically by: BRANDI Tapia CNP    "

## 2024-04-26 NOTE — PROGRESS NOTES
ASSESSMENT & PLAN    Gladis was seen today for pain.    Diagnoses and all orders for this visit:    Primary osteoarthritis of right hip  -     Physical Therapy  Referral; Future    Right hip pain  -     Orthopedic  Referral    Other orders  -     Large Joint Injection/Arthocentesis: R hip joint      64-year-old female presents with worsening right groin/hip pain over the past month, but has been ongoing for over a year.  X-ray does reveal mild to moderate degenerative changes of her right hip and physical exam reproduces symptoms with intra-articular testing.  She does have a history of a left-sided stroke, unsure if she has residual weakness.  She does have some weakness of her right hip musculature today, unsure if this is secondary to pain/apprehension or residual deficits from prior CVA.    Plan:  -Tylenol Extra Strength (1000mg) up to three times daily as needed for pain  -Continue to work on staying active and strengthening hip musculature  -Start PT and home exercise program  -Right hip injection today in clinic, provided immediate improvement in pain. Can repeat no sooner than every 3 months  -If no improvement, could consider referral to orthopedic surgery to discuss EZE    Return in about 3 months (around 7/30/2024).    Large Joint Injection/Arthocentesis: R hip joint    Date/Time: 4/30/2024 1:47 PM    Performed by: Mali Gaitan DO  Authorized by: Mali Gaitan DO    Indications:  Pain and osteoarthritis  Needle Size:  25 G  Guidance: ultrasound    Approach:  Anterior  Location:  Hip      Site:  R hip joint  Medications:  40 mg triamcinolone 40 MG/ML; 4 mL ROPivacaine 5 MG/ML; 3 mL lidocaine 1 %  Outcome:  Tolerated well, no immediate complications  Procedure discussed: discussed risks, benefits, and alternatives    Consent Given by:  Patient  Timeout: timeout called immediately prior to procedure    Prep: patient was prepped and draped in usual sterile fashion      Ultrasound was used to ensure safe and accurate needle placement and injection. Ultrasound images of the procedure were permanently stored.  1ml of 8.4% Sodium Bicarbonate solution was used to buffer the local numbing agent for today's injection        Dr. Mali Gaitan DO, CAQ  [facility]  Sports Medicine     -----  Chief Complaint   Patient presents with    Right Hip - Pain       SUBJECTIVE  Gladis Dunaway is a/an 64 year old  female who is seen in consultation at the request of  Sena Mays C.N.P. for evaluation of right hip pain.  Onset was 1 month ago, when she overdid herself and was sleeping improperly while caring for a family member. Pain is located right anterior groin primarily, and a little bit in the buttocks. Symptoms are worsened by getting up from sitting, stairs.  She has tried 3 PT Visits. Associated symptoms include, burning pain in the groin at times, instability with stairs.     The patient is seen alone    Prior injury/Surgical history of affected joint: Has had groin-related hip pain for a year overall. 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     REVIEW OF SYSTEMS:  Pertinent positives/negative: As stated above in HPI    OBJECTIVE:  /60    General: Alert and in no distress  Skin: no visable rashes  CV: Extremities appear well perfused   Resp: normal respiratory effort, no conversational dyspnea   Psych: normal mood, affect  MSK:  RIGHT Hip Exam  Inspection:    No swelling, bruising, discoloration, or obvious deformity   Palpation:    Tenderness to palpation of lateral trochanter  Active Range of Motion:     Flexion  within normal limits / IR limited by tightness / ER  show full range  Strength:    Flexion 4+/5 / abduction 4/5  Special Tests:    Positive: anterior impingement (FADIR), Stinchfield's    Negative:  CAMPOS       RADIOLOGY:  Final results and radiologist's interpretation available in the [facility]  record.  Images below were personally reviewed and discussed with the patient in the office today.  My personal interpretation of the performed imaging: X-ray of right hip and pelvis from 4/23/2024 reveals mild degenerative changes of the right hip joint, bilateral sacroiliac degenerative changes.      Review of prior external note(s) from - Outside records from primary care           Disclaimer: This note consists of text and symbols derived from dictation and/or voice recognition software. As a result, there may be errors in the script that have gone undetected. Please consider this when interpreting information found in this chart.

## 2024-04-30 ENCOUNTER — ANCILLARY PROCEDURE (OUTPATIENT)
Dept: BONE DENSITY | Facility: CLINIC | Age: 64
End: 2024-04-30
Attending: NURSE PRACTITIONER
Payer: MEDICARE

## 2024-04-30 ENCOUNTER — OFFICE VISIT (OUTPATIENT)
Dept: ORTHOPEDICS | Facility: CLINIC | Age: 64
End: 2024-04-30
Attending: NURSE PRACTITIONER
Payer: MEDICARE

## 2024-04-30 VITALS — DIASTOLIC BLOOD PRESSURE: 60 MMHG | SYSTOLIC BLOOD PRESSURE: 104 MMHG

## 2024-04-30 DIAGNOSIS — M25.551 RIGHT HIP PAIN: ICD-10-CM

## 2024-04-30 DIAGNOSIS — M16.11 PRIMARY OSTEOARTHRITIS OF RIGHT HIP: Primary | ICD-10-CM

## 2024-04-30 DIAGNOSIS — Z78.0 ASYMPTOMATIC POSTMENOPAUSAL STATUS: ICD-10-CM

## 2024-04-30 PROCEDURE — 20611 DRAIN/INJ JOINT/BURSA W/US: CPT | Mod: RT | Performed by: STUDENT IN AN ORGANIZED HEALTH CARE EDUCATION/TRAINING PROGRAM

## 2024-04-30 PROCEDURE — 77091 TBS TECHL CALCULATION ONLY: CPT | Performed by: PHYSICIAN ASSISTANT

## 2024-04-30 PROCEDURE — 99214 OFFICE O/P EST MOD 30 MIN: CPT | Mod: 25 | Performed by: STUDENT IN AN ORGANIZED HEALTH CARE EDUCATION/TRAINING PROGRAM

## 2024-04-30 PROCEDURE — 77080 DXA BONE DENSITY AXIAL: CPT | Mod: TC | Performed by: PHYSICIAN ASSISTANT

## 2024-04-30 RX ORDER — ROPIVACAINE HYDROCHLORIDE 5 MG/ML
4 INJECTION, SOLUTION EPIDURAL; INFILTRATION; PERINEURAL
Status: SHIPPED | OUTPATIENT
Start: 2024-04-30

## 2024-04-30 RX ORDER — TRIAMCINOLONE ACETONIDE 40 MG/ML
40 INJECTION, SUSPENSION INTRA-ARTICULAR; INTRAMUSCULAR
Status: SHIPPED | OUTPATIENT
Start: 2024-04-30

## 2024-04-30 RX ORDER — LIDOCAINE HYDROCHLORIDE 10 MG/ML
3 INJECTION, SOLUTION INFILTRATION; PERINEURAL
Status: SHIPPED | OUTPATIENT
Start: 2024-04-30

## 2024-04-30 RX ADMIN — TRIAMCINOLONE ACETONIDE 40 MG: 40 INJECTION, SUSPENSION INTRA-ARTICULAR; INTRAMUSCULAR at 13:47

## 2024-04-30 RX ADMIN — LIDOCAINE HYDROCHLORIDE 3 ML: 10 INJECTION, SOLUTION INFILTRATION; PERINEURAL at 13:47

## 2024-04-30 RX ADMIN — ROPIVACAINE HYDROCHLORIDE 4 ML: 5 INJECTION, SOLUTION EPIDURAL; INFILTRATION; PERINEURAL at 13:47

## 2024-04-30 NOTE — PATIENT INSTRUCTIONS
1. Primary osteoarthritis of right hip    2. Right hip pain        Plan:  -Tylenol Extra Strength (1000mg) up to three times daily as needed for pain  -Continue to work on staying active and strengthening hip musculature  -Start PT and home exercise program  -Right hip injection today in clinic     If you have any questions or concerns after your appointment, please send a ReadWave message or call the clinic at [phone]    Mali Gaitan DO, CAM  [facility]  Sports Medicine    Thank you for choosing [facility]!    DR. GAITAN'S CLINIC LOCATIONS:     [place]  TRIAGE LINE: [phone] [address] [facility] APPOINTMENTS: [phone]   [place] [address] RADIOLOGY: [phone]   (Mondays & Tuesdays) HAND THERAPY: [phone]    PHYSICAL THERAPY: [phone]   [place] BILLING QUESTIONS: [phone] [address] FAX: [phone]   [place] [address]    (Thursdays & Fridays)       Post-Injection Discharge Instructions    You may shower, however avoid swimming, tub baths or hot tubs for 24 hours following your procedure  You may have a mild to moderate increase in pain for a few days following the injection.  The lidocaine (local numbing medicine) will wear off in several hours. It usually takes 3-5 days for the steroid medication to start working although it may take up to 14 days for full effect.   You may use ice packs for 10-15 minutes, 3 to 4 times a day at the injection site for comfort if needed  You may use extra strength Tylenol for pain control if necessary   If you were fasting, you may resume your normal diet and medications after the procedure  If you have diabetes, your blood sugar may be higher than normal for 10-14 days following a steroid injection. Contact your doctor who manages your diabetes if your blood sugar is significantly higher than usual    If you experience any of the following, call Sports Medicine @  [phone] or [phone]  -Fever over 100 degrees F  -Swelling, bleeding, redness, drainage, warmth at the injection site  -New or significant worsening pain

## 2024-04-30 NOTE — LETTER
4/30/2024         RE: Gladis Dunaway  [address] [place] [address]        Dear Colleague,    Thank you for referring your patient, Gladis Dunaway, to the [facility]. Please see a copy of my visit note below.    ASSESSMENT & PLAN    Gladis was seen today for pain.    Diagnoses and all orders for this visit:    Primary osteoarthritis of right hip  -     Physical Therapy  Referral; Future    Right hip pain  -     Orthopedic  Referral    Other orders  -     Large Joint Injection/Arthocentesis: R hip joint      64-year-old female presents with worsening right groin/hip pain over the past month, but has been ongoing for over a year.  X-ray does reveal mild to moderate degenerative changes of her right hip and physical exam reproduces symptoms with intra-articular testing.  She does have a history of a left-sided stroke, unsure if she has residual weakness.  She does have some weakness of her right hip musculature today, unsure if this is secondary to pain/apprehension or residual deficits from prior CVA.    Plan:  -Tylenol Extra Strength (1000mg) up to three times daily as needed for pain  -Continue to work on staying active and strengthening hip musculature  -Start PT and home exercise program  -Right hip injection today in clinic, provided immediate improvement in pain. Can repeat no sooner than every 3 months  -If no improvement, could consider referral to orthopedic surgery to discuss EZE    Return in about 3 months (around 7/30/2024).    Large Joint Injection/Arthocentesis: R hip joint    Date/Time: 4/30/2024 1:47 PM    Performed by: Mali Gaitan DO  Authorized by: Mali Gaitan DO    Indications:  Pain and osteoarthritis  Needle Size:  25 G  Guidance: ultrasound    Approach:  Anterior  Location:  Hip      Site:  R hip joint  Medications:  40 mg triamcinolone 40 MG/ML; 4 mL ROPivacaine 5 MG/ML; 3 mL lidocaine 1  %  Outcome:  Tolerated well, no immediate complications  Procedure discussed: discussed risks, benefits, and alternatives    Consent Given by:  Patient  Timeout: timeout called immediately prior to procedure    Prep: patient was prepped and draped in usual sterile fashion     Ultrasound was used to ensure safe and accurate needle placement and injection. Ultrasound images of the procedure were permanently stored.  1ml of 8.4% Sodium Bicarbonate solution was used to buffer the local numbing agent for today's injection        Dr. Mali Gaitan, DO, CAQ  [facility]  Sports Medicine     -----  Chief Complaint   Patient presents with     Right Hip - Pain       SUBJECTIVE  Gladis Dunaway is a/an 64 year old  female who is seen in consultation at the request of  Sena Mays C.N.P. for evaluation of right hip pain.  Onset was 1 month ago, when she overdid herself and was sleeping improperly while caring for a family member. Pain is located right anterior groin primarily, and a little bit in the buttocks. Symptoms are worsened by getting up from sitting, stairs.  She has tried 3 PT Visits. Associated symptoms include, burning pain in the groin at times, instability with stairs.     The patient is seen alone    Prior injury/Surgical history of affected joint: Has had groin-related hip pain for a year overall. 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     REVIEW OF SYSTEMS:  Pertinent positives/negative: As stated above in HPI    OBJECTIVE:  /60    General: Alert and in no distress  Skin: no visable rashes  CV: Extremities appear well perfused   Resp: normal respiratory effort, no conversational dyspnea   Psych: normal mood, affect  MSK:  RIGHT Hip Exam  Inspection:    No swelling, bruising, discoloration, or obvious deformity   Palpation:    Tenderness to palpation of lateral trochanter  Active Range of Motion:     Flexion  within normal limits /  IR limited by tightness / ER  show full range  Strength:    Flexion 4+/5 / abduction 4/5  Special Tests:    Positive: anterior impingement (FADIR), Stinchfield's    Negative:  CAMPOS       RADIOLOGY:  Final results and radiologist's interpretation available in the [facility] health record.  Images below were personally reviewed and discussed with the patient in the office today.  My personal interpretation of the performed imaging: X-ray of right hip and pelvis from 4/23/2024 reveals mild degenerative changes of the right hip joint, bilateral sacroiliac degenerative changes.      Review of prior external note(s) from - Outside records from primary care           Disclaimer: This note consists of text and symbols derived from dictation and/or voice recognition software. As a result, there may be errors in the script that have gone undetected. Please consider this when interpreting information found in this chart.        Again, thank you for allowing me to participate in the care of your patient.        Sincerely,        Mali Gaitan, DO

## 2024-06-09 ASSESSMENT — ACTIVITIES OF DAILY LIVING (ADL)
WALKING_15_MINUTES_OR_GREATER: SLIGHT DIFFICULTY
HOS_ADL_SCORE(%): 56.25
WALKING_INITIALLY: SLIGHT DIFFICULTY
HOS_ADL_ITEM_SCORE_TOTAL: 36
WALKING_DOWN_STEEP_HILLS: MODERATE DIFFICULTY
PLEASE_INDICATE_YOR_PRIMARY_REASON_FOR_REFERRAL_TO_THERAPY:: HIP
CUTTING/LATERAL_MOVEMENTS: MODERATE DIFFICULTY
WALKING_INITIALLY: SLIGHT DIFFICULTY
HOW_WOULD_YOU_RATE_YOUR_CURRENT_LEVEL_OF_FUNCTION?: ABNORMAL
HEAVY_WORK: EXTREME DIFFICULTY
STEPPING UP AND DOWN CURBS: MODERATE DIFFICULTY
SPORTS_TOTAL_ITEM_SCORE: 0
LOW_IMPACT_ACTIVITIES_LIKE_FAST_WALKING: MODERATE DIFFICULTY
ADL_SCORE(%): 0
ROLLING_OVER_IN_BED: MODERATE DIFFICULTY
SPORTS_SCORE(%): 0
WALKING_APPROXIMATELY_10_MINUTES: SLIGHT DIFFICULTY
HOS_ADL_HIGHEST_POTENTIAL_SCORE: 64
ADL_HIGHEST_POTENTIAL_SCORE: 68
GOING_DOWN_1_FLIGHT_OF_STAIRS: MODERATE DIFFICULTY
HOW_WOULD_YOU_RATE_YOUR_CURRENT_LEVEL_OF_FUNCTION_DURING_YOUR_USUAL_ACTIVITIES_OF_DAILY_LIVING_FROM_0_TO_100_WITH_100_BEING_YOUR_LEVEL_OF_FUNCTION_PRIOR_TO_YOUR_HIP_PROBLEM_AND_0_BEING_THE_INABILITY_TO_PERFORM_ANY_OF_YOUR_USUAL_DAILY_ACTIVITIES?: 70
SPORTS_HIGHEST_POTENTIAL_SCORE: 36
DEEP_SQUATTING: MODERATE DIFFICULTY
WALKING_UP_STEEP_HILLS: MODERATE DIFFICULTY
WALKING_15_MINUTES_OR_GREATER: SLIGHT DIFFICULTY
GETTING INTO AND OUT OF AN AVERAGE CAR: MODERATE DIFFICULTY
STANDING_FOR_15_MINUTES: NO DIFFICULTY AT ALL
WALKING_FOR_APPROXIMATELY_10_MINUTES: SLIGHT DIFFICULTY
HOW_WOULD_YOU_RATE_YOUR_CURRENT_LEVEL_OF_FUNCTION_DURING_YOUR_USUAL_ACTIVITIES_OF_DAILY_LIVING_FROM_0_TO_100_WITH_100_BEING_YOUR_LEVEL_OF_FUNCTION_PRIOR_TO_YOUR_HIP_PROBLEM_AND_0_BEING_THE_INABILITY_TO_PERFORM_ANY_OF_YOUR_USUAL_DAILY_ACTIVITIES?: 70
GOING DOWN 1 FLIGHT OF STAIRS: MODERATE DIFFICULTY
LIGHT_TO_MODERATE_WORK: MODERATE DIFFICULTY
GETTING_INTO_AND_OUT_OF_AN_AVERAGE_CAR: MODERATE DIFFICULTY
STANDING FOR 15 MINUTES: NO DIFFICULTY AT ALL
GOING UP 1 FLIGHT OF STAIRS: MODERATE DIFFICULTY
RECREATIONAL_ACTIVITIES: MODERATE DIFFICULTY
GOING_UP_1_FLIGHT_OF_STAIRS: MODERATE DIFFICULTY
ROLLING OVER IN BED: MODERATE DIFFICULTY
STEPPING_UP_AND_DOWN_CURBS: MODERATE DIFFICULTY
WALKING_UP_STEEP_HILLS: MODERATE DIFFICULTY
ADL_TOTAL_ITEM_SCORE: 0
SPORTS_COUNT: 9
HEAVY_WORK: EXTREME DIFFICULTY
SITTING_FOR_15_MINUTES: SLIGHT DIFFICULTY
SITTING FOR 15 MINUTES: SLIGHT DIFFICULTY
PUTTING ON SOCKS AND SHOES: SLIGHT DIFFICULTY
LIGHT_TO_MODERATE_WORK: MODERATE DIFFICULTY
RECREATIONAL ACTIVITIES: MODERATE DIFFICULTY
TWISTING/PIVOTING ON INVOLVED LEG: MODERATE DIFFICULTY
DEEP SQUATTING: MODERATE DIFFICULTY
ABILITY_TO_PERFORM_ACTIVITY_WITH_YOUR_NORMAL_TECHNIQUE: MODERATE DIFFICULTY
ADL_COUNT: 17
WALKING_DOWN_STEEP_HILLS: MODERATE DIFFICULTY
TWISTING/PIVOTING_ON_INVOLVED_LEG: MODERATE DIFFICULTY
PUTTING_ON_SOCKS_AND_SHOES: SLIGHT DIFFICULTY

## 2024-06-10 ENCOUNTER — THERAPY VISIT (OUTPATIENT)
Dept: PHYSICAL THERAPY | Facility: CLINIC | Age: 64
End: 2024-06-10
Attending: STUDENT IN AN ORGANIZED HEALTH CARE EDUCATION/TRAINING PROGRAM
Payer: MEDICARE

## 2024-06-10 DIAGNOSIS — M16.11 PRIMARY OSTEOARTHRITIS OF RIGHT HIP: ICD-10-CM

## 2024-06-10 PROCEDURE — 97161 PT EVAL LOW COMPLEX 20 MIN: CPT | Mod: GP | Performed by: PHYSICAL THERAPIST

## 2024-06-10 PROCEDURE — 97110 THERAPEUTIC EXERCISES: CPT | Mod: GP | Performed by: PHYSICAL THERAPIST

## 2024-06-10 NOTE — PROGRESS NOTES
"PHYSICAL THERAPY EVALUATION  Type of Visit: Evaluation    See electronic medical record for Abuse and Falls Screening details.    Subjective   KEY PT FINDINGS:  1) Mild loss of range of motion in the right hip  2) Decreased proximal hip strength  3) Stiff leg gait pattern with trendelenberg    Physical Therapy Initial Evaluation: Subjective History     Injury/Condition Details:  Presenting Complaint Right Hip   Onset Timing/Date April 30, 2024 (MD referral)    Mechanism See previous notes for full history.     Both of her parents have passed in the last couple of months and so has done a lot of traveling and had a lot of stress. She has not been doing much of her HEP.   About 1 month ago, she had a situation where she was getting shooting pain down the right leg.   Got x-rays and saw arthritis. Saw Ortho and has injection.   I constantly thinking \"how do I rearrange my body to not put the pressure on the hip?\"   It is affecting her quality of life because she is slowing down and afraid of getting the pain.   Since the injection, there were about 2 weeks where she was moving more normally.   Takes care of her grandson (2 y.o) and is really sore after those days.      Symptom Behavior Details    Primary Symptoms Constant symptoms; worsen with activity, pain (Location: anterior hip, feels lower than before. The catching continues to happen but it is happening with smaller movements now, Quality: Sharp and Aching/Throbbing), catching/locking, weakness   Worst Pain 6-7/10 (with see below)   Symptom Provocators No issues sleeping/at night.     Walking > 10-15 minutes.   Carrying grandson.  Tentative on stairs (always holds onto railing)     Best Pain 1-2/10    Symptom Relievers Changing position   Time of day dependent? Worse in evening after activity   Recent symptom change? 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    Usual physical activities  (within past year) Usual routine: walking, 3 x 20 minutes/day  Enjoys yoga, pilates.    Orthopaedic history See Epic Chart   Notable medical history See Epic Chart   Patient Reported Health good           Presenting condition or subjective complaint: Arthritis in right hip  Date of onset: 04/30/24    Relevant medical history:     Dates & types of surgery: C-sections - 1984, 1986    Prior diagnostic imaging/testing results: X-ray; Bone scan     Prior therapy history for the same diagnosis, illness or injury: Yes 3 physical therpy sessions fall 2023    Living Environment  Social support: With a significant other or spouse   Type of home: House   Stairs to enter the home: Yes   Is there a railing: Yes   Ramp: No   Stairs inside the home: Yes 12 Is there a railing: Yes   Help at home: None  Equipment owned:       Employment: No    Hobbies/Interests: Cooking, caring for [facility]    Patient goals for therapy: Walk up stairs holding toddler, get in and out of car without pain, get down on the floor to exercise     Objective     Hip Physical Therapy Examination    Dynamic Movement Screen:  2 leg stance: equal weight bearing, normal appearing alignment  2 leg squat: Minimal posterior hip excursion, decreased trunk flexion    1 leg stance: Right: Proprio deficits; Left: Proprio deficits    Gait: Stiff leg gait pattern, trendelenberg pattern             Hip Joint ROM   Flex Ext ER IR ABD ADD   Right - deg - deg 63 deg 20 deg - deg - deg   Left - deg - deg 75 deg 35 deg - deg - deg     Lower Extremity Muscle Strength (x/5)   Hip IR Hip ER Knee Ext Hip ABD Hip Ext Knee Flex   Right  4/5 4-/5 4/5 4-/5 4-/5 4/5   Left 4/5 4-/5 4/5 4-/5 4-/5 4/5     Basic Muscle Activation:  Transversus Abdominus: Fair, co-contraction with glutes, mild posterior pelvic tilt observed    Lower Extremity Flexibility Screen:  Hamstrings (Supine SLR): Right: -;  Left: -  Gastroc (Supine Active DF): Right: NT; Left: NT  Quadriceps (Prone KF): Right: +; Left: +  Hip Flexors (Mauri Test): Right: +; Left: +  ITB (Los Test): Right: NT; Left: NT    Hip Special Testing:  Test Right Left   FADDIR (-) (-)   CAMPOS (-) (-)   Log Roll (ER) (-) (-)   Resisted SLR (-) (-)     Resisted Movement Testing:  Test Right Left   Adduction Squeeze (-) (-)   Psoas (-) (-)   TFL (-) (-)   ABD (-) (-)     Hip Palpation:  Tender to palpation at: gluteus medius muscle belly, gluteus oscar muscle belly, and piriformis        Assessment & Plan   CLINICAL IMPRESSIONS  Medical Diagnosis: Right hip OA    Treatment Diagnosis: Right hip pain   Impression/Assessment: Patient is a 64 year old female with right hip complaints.  The following significant findings have been identified: Pain, Decreased ROM/flexibility, Decreased joint mobility, Decreased strength, Impaired balance, Impaired gait, Impaired muscle performance, and Decreased activity tolerance. These impairments interfere with their ability to perform self care tasks, recreational activities, household chores, household mobility, and community mobility as compared to previous level of function.     Clinical Decision Making (Complexity):  Clinical Presentation: Stable/Uncomplicated  Clinical Presentation Rationale: based on medical and personal factors listed in PT evaluation  Clinical Decision Making (Complexity): Low complexity    PLAN OF CARE  Treatment Interventions:  Modalities: Dry Needling  Interventions: Manual Therapy, Neuromuscular Re-education, Therapeutic Activity, Therapeutic Exercise    Long Term Goals     PT Goal 1  Goal Identifier: Walking  Goal Description: Patient will be able to return to walking 30 minutes without increased pain in her hip  Rationale: to maximize safety and independence within the community  Target Date: 09/07/24      Frequency of Treatment: 1x/week  Duration of Treatment: 4 weeks then 2x/month for 2  months    Recommended Referrals to Other Professionals: Physical Therapy  Education Assessment:   Learner/Method: Patient;No Barriers to Learning    Risks and benefits of evaluation/treatment have been explained.   Patient/Family/caregiver agrees with Plan of Care.     Evaluation Time:     PT Eval, Low Complexity Minutes (97266): 17     Signing Clinician: Isis Gonzales, PT      [facility]                                                                                   OUTPATIENT PHYSICAL THERAPY      PLAN OF TREATMENT FOR OUTPATIENT REHABILITATION   Patient's Last Name, First Name, Gladis Savage YOB: 1960   Provider's Name   [facility]   Medical Record No.  [id number]     Onset Date: 04/30/24  Start of Care Date: 06/10/24     Medical Diagnosis:  Right hip OA      PT Treatment Diagnosis:  Right hip pain Plan of Treatment  Frequency/Duration: 1x/week/ 4 weeks then 2x/month for 2 months    Certification date from 06/10/24 to 09/07/24         See note for plan of treatment details and functional goals     Isis Gonzales, PT                         I CERTIFY THE NEED FOR THESE SERVICES FURNISHED UNDER        THIS PLAN OF TREATMENT AND WHILE UNDER MY CARE     (Physician attestation of this document indicates review and certification of the therapy plan).              Referring Provider:  Mali Gaitan    Initial Assessment  See Epic Evaluation- Start of Care Date: 06/10/24

## 2024-06-17 ENCOUNTER — THERAPY VISIT (OUTPATIENT)
Dept: PHYSICAL THERAPY | Facility: CLINIC | Age: 64
End: 2024-06-17
Attending: STUDENT IN AN ORGANIZED HEALTH CARE EDUCATION/TRAINING PROGRAM
Payer: MEDICARE

## 2024-06-17 DIAGNOSIS — R29.898 WEAKNESS OF RIGHT HIP: ICD-10-CM

## 2024-06-17 DIAGNOSIS — M16.11 PRIMARY OSTEOARTHRITIS OF RIGHT HIP: Primary | ICD-10-CM

## 2024-06-17 PROCEDURE — 97110 THERAPEUTIC EXERCISES: CPT | Mod: GP | Performed by: PHYSICAL THERAPIST

## 2024-06-17 PROCEDURE — 97140 MANUAL THERAPY 1/> REGIONS: CPT | Mod: GP | Performed by: PHYSICAL THERAPIST

## 2024-06-25 ENCOUNTER — THERAPY VISIT (OUTPATIENT)
Dept: PHYSICAL THERAPY | Facility: CLINIC | Age: 64
End: 2024-06-25
Payer: MEDICARE

## 2024-06-25 DIAGNOSIS — R29.898 WEAKNESS OF RIGHT HIP: ICD-10-CM

## 2024-06-25 DIAGNOSIS — M16.11 PRIMARY OSTEOARTHRITIS OF RIGHT HIP: Primary | ICD-10-CM

## 2024-06-25 PROCEDURE — 97110 THERAPEUTIC EXERCISES: CPT | Mod: GP | Performed by: PHYSICAL THERAPIST

## 2024-06-25 PROCEDURE — 97140 MANUAL THERAPY 1/> REGIONS: CPT | Mod: GP | Performed by: PHYSICAL THERAPIST

## 2024-07-15 ENCOUNTER — ANCILLARY ORDERS (OUTPATIENT)
Dept: RADIOLOGY | Facility: CLINIC | Age: 64
End: 2024-07-15

## 2024-07-15 ENCOUNTER — THERAPY VISIT (OUTPATIENT)
Dept: PHYSICAL THERAPY | Facility: CLINIC | Age: 64
End: 2024-07-15
Payer: MEDICARE

## 2024-07-15 DIAGNOSIS — M16.11 PRIMARY OSTEOARTHRITIS OF RIGHT HIP: Primary | ICD-10-CM

## 2024-07-15 DIAGNOSIS — R29.898 WEAKNESS OF RIGHT HIP: ICD-10-CM

## 2024-07-15 PROCEDURE — 97140 MANUAL THERAPY 1/> REGIONS: CPT | Mod: GP | Performed by: PHYSICAL THERAPIST

## 2024-07-15 PROCEDURE — 97110 THERAPEUTIC EXERCISES: CPT | Mod: GP | Performed by: PHYSICAL THERAPIST

## 2024-07-18 ENCOUNTER — ANCILLARY PROCEDURE (OUTPATIENT)
Dept: MAMMOGRAPHY | Facility: CLINIC | Age: 64
End: 2024-07-18
Attending: PHYSICIAN ASSISTANT
Payer: MEDICARE

## 2024-07-18 DIAGNOSIS — Z12.31 VISIT FOR SCREENING MAMMOGRAM: ICD-10-CM

## 2024-07-18 PROCEDURE — 77067 SCR MAMMO BI INCL CAD: CPT | Mod: TC | Performed by: RADIOLOGY

## 2024-07-18 PROCEDURE — 77063 BREAST TOMOSYNTHESIS BI: CPT | Mod: TC | Performed by: RADIOLOGY

## 2024-09-03 ENCOUNTER — OFFICE VISIT (OUTPATIENT)
Dept: FAMILY MEDICINE | Facility: CLINIC | Age: 64
End: 2024-09-03
Payer: MEDICARE

## 2024-09-03 VITALS
RESPIRATION RATE: 16 BRPM | WEIGHT: 112.8 LBS | DIASTOLIC BLOOD PRESSURE: 60 MMHG | SYSTOLIC BLOOD PRESSURE: 90 MMHG | HEIGHT: 62 IN | HEART RATE: 65 BPM | TEMPERATURE: 97.3 F | BODY MASS INDEX: 20.76 KG/M2 | OXYGEN SATURATION: 100 %

## 2024-09-03 DIAGNOSIS — Z86.73 H/O: CVA (CEREBROVASCULAR ACCIDENT): ICD-10-CM

## 2024-09-03 DIAGNOSIS — Z23 ENCOUNTER FOR IMMUNIZATION: ICD-10-CM

## 2024-09-03 DIAGNOSIS — E78.00 HYPERCHOLESTEROLEMIA: ICD-10-CM

## 2024-09-03 DIAGNOSIS — Z00.00 ENCOUNTER FOR MEDICARE ANNUAL WELLNESS EXAM: Primary | ICD-10-CM

## 2024-09-03 PROBLEM — M85.89 OSTEOPENIA OF MULTIPLE SITES: Status: ACTIVE | Noted: 2017-03-23

## 2024-09-03 LAB
ERYTHROCYTE [DISTWIDTH] IN BLOOD BY AUTOMATED COUNT: 13.5 % (ref 10–15)
HCT VFR BLD AUTO: 40.9 % (ref 35–47)
HGB BLD-MCNC: 12.8 G/DL (ref 11.7–15.7)
MCH RBC QN AUTO: 30 PG (ref 26.5–33)
MCHC RBC AUTO-ENTMCNC: 31.3 G/DL (ref 31.5–36.5)
MCV RBC AUTO: 96 FL (ref 78–100)
PLATELET # BLD AUTO: 298 10E3/UL (ref 150–450)
RBC # BLD AUTO: 4.27 10E6/UL (ref 3.8–5.2)
WBC # BLD AUTO: 6.9 10E3/UL (ref 4–11)

## 2024-09-03 PROCEDURE — G0438 PPPS, INITIAL VISIT: HCPCS | Performed by: PHYSICIAN ASSISTANT

## 2024-09-03 PROCEDURE — 36415 COLL VENOUS BLD VENIPUNCTURE: CPT | Performed by: PHYSICIAN ASSISTANT

## 2024-09-03 PROCEDURE — 85027 COMPLETE CBC AUTOMATED: CPT | Performed by: PHYSICIAN ASSISTANT

## 2024-09-03 PROCEDURE — G0008 ADMIN INFLUENZA VIRUS VAC: HCPCS | Performed by: PHYSICIAN ASSISTANT

## 2024-09-03 PROCEDURE — 90662 IIV NO PRSV INCREASED AG IM: CPT | Performed by: PHYSICIAN ASSISTANT

## 2024-09-03 PROCEDURE — 80061 LIPID PANEL: CPT | Performed by: PHYSICIAN ASSISTANT

## 2024-09-03 PROCEDURE — 80048 BASIC METABOLIC PNL TOTAL CA: CPT | Performed by: PHYSICIAN ASSISTANT

## 2024-09-03 RX ORDER — ATORVASTATIN CALCIUM 20 MG/1
20 TABLET, FILM COATED ORAL DAILY
Qty: 90 TABLET | Refills: 3 | Status: SHIPPED | OUTPATIENT
Start: 2024-09-03

## 2024-09-03 ASSESSMENT — PAIN SCALES - GENERAL: PAINLEVEL: NO PAIN (0)

## 2024-09-03 NOTE — PROGRESS NOTES
Preventive Care Visit  [facility]  Bailee Sands PA-C, Physician Assistant - Medical  Sep 3, 2024      Assessment & Plan     Encounter for Medicare annual wellness exam  - REVIEW OF HEALTH MAINTENANCE PROTOCOL ORDERS  - CBC with platelets  - Basic metabolic panel  - Lipid panel reflex to direct LDL Non-fasting  - Ob/Gyn  Referral  - CBC with platelets  - Basic metabolic panel  - Lipid panel reflex to direct LDL Non-fasting    Hypercholesterolemia  H/O: CVA (cerebrovascular accident) - refills provided  - atorvastatin (LIPITOR) 20 MG tablet  Dispense: 90 tablet; Refill: 3    Encounter for immunization  - INFLUENZA HIGH DOSE, TRIVALENT, PF (FLUZONE)      Counseling  Appropriate preventive services were addressed with this patient via screening, questionnaire, or discussion as appropriate for fall prevention, nutrition, physical activity, Tobacco-use cessation, social engagement, weight loss and cognition.  Checklist reviewing preventive services available has been given to the patient.  Reviewed patient's diet, addressing concerns and/or questions.   She is at risk for lack of exercise and has been provided with information to increase physical activity for the benefit of her well-being.   The patient was instructed to see the dentist every 6 months.         Subjective   Gladis is a 64 year old, presenting for the following:  Annual Visit (Referral for gynecologist)    Gladis here today for MAW visit    Both her parents passed this year    Arthritis is improved - PT helped, did an injection in the spring    Has dropped a little bit of weight without trying  115lbs last year, 112lbs today  Active, eats well, watches her toddler grandson    Wonders about pap screening - last pap in 2022 was unsatisfactory and prior to this was 2020 and NILM  Would like to do this with GYN           9/3/2024     2:02 PM   Additional Questions   Roomed by Sterling Pichardo   Accompanied by [facility]  Directive  Patient does not have a Health Care Directive or Living Will: Patient states has Advance Directive and will bring in a copy to clinic.    HPI      8/29/2024   General Health   How would you rate your overall physical health? Good   Feel stress (tense, anxious, or unable to sleep) To some extent      (!) STRESS CONCERN      8/29/2024   Nutrition   Diet: Regular (no restrictions)          8/29/2024   Exercise   Days per week of moderate/strenous exercise 3 days   Average minutes spent exercising at this level 30 min          8/29/2024   Social Factors   Frequency of gathering with friends or relatives Three times a week   Worry food won't last until get money to buy more No   Food not last or not have enough money for food? No   Do you have housing? (Housing is defined as stable permanent housing and does not include staying ouside in a car, in a tent, in an abandoned building, in an overnight shelter, or couch-surfing.) Yes   Are you worried about losing your housing? No   Lack of transportation? No   Unable to get utilities (heat,electricity)? No          8/29/2024   Fall Risk   Fallen 2 or more times in the past year? No   Trouble with walking or balance? No             8/29/2024   Activities of Daily Living- Home Safety   Needs help with the following daily activites None of the above   Safety concerns in the home None of the above          8/29/2024   Dental   Dentist two times every year? (!) NO          8/29/2024   Hearing Screening   Hearing concerns? None of the above          8/29/2024   Driving Risk Screening   Patient/family members have concerns about driving No          8/29/2024   General Alertness/Fatigue Screening   Have you been more tired than usual lately? No          8/29/2024   Urinary Incontinence Screening   Bothered by leaking urine in past 6 months No          8/29/2024   TB Screening   Were you born outside of the [place]? No      Today's PHQ-2 Score:       9/3/2024    12:09 PM   PHQ-2  ( 1999 Pfizer)   Q1: Little interest or pleasure in doing things 0   Q2: Feeling down, depressed or hopeless 0   PHQ-2 Score 0   Q1: Little interest or pleasure in doing things Not at all   Q2: Feeling down, depressed or hopeless Not at all   PHQ-2 Score 0         8/29/2024   Substance Use   Alcohol more than 3/day or more than 7/wk No   Do you have a current opioid prescription? No   How severe/bad is pain from 1 to 10? 0/10 (No Pain)   Do you use any other substances recreationally? No      Social History     Tobacco Use    Smoking status: Never     Passive exposure: Never    Smokeless tobacco: Never   Vaping Use    Vaping status: Never Used   Substance Use Topics    Alcohol use: Not Currently    Drug use: Never           7/18/2024   LAST FHS-7 RESULTS   1st degree relative breast or ovarian cancer Yes   Any relative bilateral breast cancer No   Any male have breast cancer No   Any ONE woman have BOTH breast AND ovarian cancer Yes   Any woman with breast cancer before 50yrs No   2 or more relatives with breast AND/OR ovarian cancer No   2 or more relatives with breast AND/OR bowel cancer No           Mammogram Screening - Mammogram every 1-2 years updated in Health Maintenance based on mutual decision making      History of abnormal Pap smear: No - age 30- 64 PAP with HPV every 5 years recommended       ASCVD Risk   The ASCVD Risk score (Alexi DK, et al., 2019) failed to calculate for the following reasons:    The patient has a prior MI or stroke diagnosis          Reviewed and updated as needed this visit by Provider   Tobacco  Allergies  Meds  Problems  Med Hx  Surg Hx  Fam Hx            Current providers sharing in care for this patient include:  Patient Care Team:  Bailee Sands PA-C as PCP - General (Physician Assistant - Medical)  Bailee Sands PA-C as Assigned PCP  Sonu Pringle MD as Assigned Musculoskeletal Provider  Mali Gaitan DO as Assigned Neuroscience  "Provider    The following health maintenance items are reviewed in Epic and correct as of today:  Health Maintenance   Topic Date Due    ZOSTER IMMUNIZATION (1 of 2) Never done    RSV VACCINE (1 - 1-dose 60+ series) Never done    LIPID  08/23/2024    COVID-19 Vaccine (5 - 2023-24 season) 09/01/2024    MEDICARE ANNUAL WELLNESS VISIT  09/03/2025    ANNUAL REVIEW OF HM ORDERS  09/03/2025    FALL RISK ASSESSMENT  09/03/2025    DEXA  05/01/2026    MAMMO SCREENING  07/18/2026    GLUCOSE  08/23/2026    COLORECTAL CANCER SCREENING  09/11/2026    DTAP/TDAP/TD IMMUNIZATION (3 - Td or Tdap) 03/18/2029    ADVANCE CARE PLANNING  09/03/2029    PHQ-2 (once per calendar year)  Completed    INFLUENZA VACCINE  Completed    Pneumococcal Vaccine: 65+ Years  Completed    HPV IMMUNIZATION  Aged Out    MENINGITIS IMMUNIZATION  Aged Out    RSV MONOCLONAL ANTIBODY  Aged Out    HEPATITIS C SCREENING  Discontinued        Objective    Exam  BP 90/60   Pulse 65   Temp 97.3  F (36.3  C) (Temporal)   Resp 16   Ht 1.562 m (5' 1.5\")   Wt 51.2 kg (112 lb 12.8 oz)   LMP  (LMP Unknown)   SpO2 100%   Breastfeeding No   BMI 20.97 kg/m     Estimated body mass index is 20.97 kg/m  as calculated from the following:    Height as of this encounter: 1.562 m (5' 1.5\").    Weight as of this encounter: 51.2 kg (112 lb 12.8 oz).    Physical Exam  GENERAL: alert and no distress  EYES: Eyes grossly normal to inspection, PERRL and conjunctivae and sclerae normal  HENT: ear canals and TM's normal, nose and mouth without ulcers or lesions  NECK: no adenopathy, no asymmetry, masses, or scars  RESP: lungs clear to auscultation - no rales, rhonchi or wheezes  CV: regular rate and rhythm, normal S1 S2, no S3 or S4, no murmur, click or rub, no peripheral edema  ABDOMEN: soft, nontender, no hepatosplenomegaly, no masses and bowel sounds normal  MS: no gross musculoskeletal defects noted, no edema  SKIN: no suspicious lesions or rashes  NEURO: Normal strength and " tone, mentation intact and speech normal  PSYCH: mentation appears normal, affect normal/bright         9/3/2024   Mini Cog   Clock Draw Score 2 Normal   3 Item Recall 3 objects recalled   Mini Cog Total Score 5                 Signed Electronically by: Bailee Sands PA-C

## 2024-09-03 NOTE — PATIENT INSTRUCTIONS
Patient Education   Preventive Care Advice   This is general advice given by our system to help you stay healthy. However, your care team may have specific advice just for you. Please talk to your care team about your preventive care needs.  Nutrition  Eat 5 or more servings of fruits and vegetables each day.  Try wheat bread, brown rice and whole grain pasta (instead of white bread, rice, and pasta).  Get enough calcium and vitamin D. Check the label on foods and aim for 100% of the RDA (recommended daily allowance).  Lifestyle  Exercise at least 150 minutes each week  (30 minutes a day, 5 days a week).  Do muscle strengthening activities 2 days a week. These help control your weight and prevent disease.  No smoking.  Wear sunscreen to prevent skin cancer.  Have a dental exam and cleaning every 6 months.  Yearly exams  See your health care team every year to talk about:  Any changes in your health.  Any medicines your care team has prescribed.  Preventive care, family planning, and ways to prevent chronic diseases.  Shots (vaccines)   HPV shots (up to age 26), if you've never had them before.  Hepatitis B shots (up to age 59), if you've never had them before.  COVID-19 shot: Get this shot when it's due.  Flu shot: Get a flu shot every year.  Tetanus shot: Get a tetanus shot every 10 years.  Pneumococcal, hepatitis A, and RSV shots: Ask your care team if you need these based on your risk.  Shingles shot (for age 50 and up)  General health tests  Diabetes screening:  Starting at age 35, Get screened for diabetes at least every 3 years.  If you are younger than age 35, ask your care team if you should be screened for diabetes.  Cholesterol test: At age 39, start having a cholesterol test every 5 years, or more often if advised.  Bone density scan (DEXA): At age 50, ask your care team if you should have this scan for osteoporosis (brittle bones).  Hepatitis C: Get tested at least once in your life.  STIs (sexually  transmitted infections)  Before age 24: Ask your care team if you should be screened for STIs.  After age 24: Get screened for STIs if you're at risk. You are at risk for STIs (including HIV) if:  You are sexually active with more than one person.  You don't use condoms every time.  You or a partner was diagnosed with a sexually transmitted infection.  If you are at risk for HIV, ask about PrEP medicine to prevent HIV.  Get tested for HIV at least once in your life, whether you are at risk for HIV or not.  Cancer screening tests  Cervical cancer screening: If you have a cervix, begin getting regular cervical cancer screening tests starting at age 21.  Breast cancer scan (mammogram): If you've ever had breasts, begin having regular mammograms starting at age 40. This is a scan to check for breast cancer.  Colon cancer screening: It is important to start screening for colon cancer at age 45.  Have a colonoscopy test every 10 years (or more often if you're at risk) Or, ask your provider about stool tests like a FIT test every year or Cologuard test every 3 years.  To learn more about your testing options, visit:   .  For help making a decision, visit:   https://bit.ly/kf38714.  Prostate cancer screening test: If you have a prostate, ask your care team if a prostate cancer screening test (PSA) at age 55 is right for you.  Lung cancer screening: If you are a current or former smoker ages 50 to 80, ask your care team if ongoing lung cancer screenings are right for you.  For informational purposes only. Not to replace the advice of your health care provider. Copyright   2023 [employer]. All rights reserved. Clinically reviewed by the [facility] Transitions Program. [id number] - REV 01/24.

## 2024-09-04 LAB
ANION GAP SERPL CALCULATED.3IONS-SCNC: 11 MMOL/L (ref 7–15)
BUN SERPL-MCNC: 12.4 MG/DL (ref 8–23)
CALCIUM SERPL-MCNC: 9.9 MG/DL (ref 8.8–10.4)
CHLORIDE SERPL-SCNC: 103 MMOL/L (ref 98–107)
CHOLEST SERPL-MCNC: 186 MG/DL
CREAT SERPL-MCNC: 0.64 MG/DL (ref 0.51–0.95)
EGFRCR SERPLBLD CKD-EPI 2021: >90 ML/MIN/1.73M2
FASTING STATUS PATIENT QL REPORTED: NO
FASTING STATUS PATIENT QL REPORTED: NO
GLUCOSE SERPL-MCNC: 87 MG/DL (ref 70–99)
HCO3 SERPL-SCNC: 28 MMOL/L (ref 22–29)
HDLC SERPL-MCNC: 87 MG/DL
LDLC SERPL CALC-MCNC: 85 MG/DL
NONHDLC SERPL-MCNC: 99 MG/DL
POTASSIUM SERPL-SCNC: 4.3 MMOL/L (ref 3.4–5.3)
SODIUM SERPL-SCNC: 142 MMOL/L (ref 135–145)
TRIGL SERPL-MCNC: 68 MG/DL

## 2024-10-22 ENCOUNTER — OFFICE VISIT (OUTPATIENT)
Dept: OBGYN | Facility: CLINIC | Age: 64
End: 2024-10-22
Attending: PHYSICIAN ASSISTANT
Payer: MEDICARE

## 2024-10-22 VITALS
HEART RATE: 67 BPM | SYSTOLIC BLOOD PRESSURE: 103 MMHG | TEMPERATURE: 97.5 F | BODY MASS INDEX: 21.19 KG/M2 | DIASTOLIC BLOOD PRESSURE: 60 MMHG | WEIGHT: 114 LBS

## 2024-10-22 DIAGNOSIS — R87.615 UNSATISFACTORY CERVICAL PAPANICOLAOU SMEAR: ICD-10-CM

## 2024-10-22 DIAGNOSIS — R10.2 PELVIC PAIN IN FEMALE: Primary | ICD-10-CM

## 2024-10-22 DIAGNOSIS — Z12.4 SCREENING FOR MALIGNANT NEOPLASM OF CERVIX: ICD-10-CM

## 2024-10-22 PROCEDURE — 87624 HPV HI-RISK TYP POOLED RSLT: CPT | Mod: GA | Performed by: OBSTETRICS & GYNECOLOGY

## 2024-10-22 PROCEDURE — 99203 OFFICE O/P NEW LOW 30 MIN: CPT | Performed by: OBSTETRICS & GYNECOLOGY

## 2024-10-22 PROCEDURE — G0145 SCR C/V CYTO,THINLAYER,RESCR: HCPCS | Performed by: OBSTETRICS & GYNECOLOGY

## 2024-10-22 NOTE — PROGRESS NOTES
GYN Clinic Visit  Date of visit: 10/22/2024  Chief Complaint: Pap, pelvic pain    HPI:   Gladis Dunaway is a 64 year old postmenopausal female who presents for cervical cancer screening.    Pap history as follows:  2020 - NIL  2022 - unsatisfactory pap  No HPV testing was done  Pt intended to get pap before she turned 64 but many life events came first  No h/o abnormal paps in the past  Expresses understanding of recommendation to stop paps at age 65 but she was supposed to get a follow up after her pap in  but has not yet done it  Wants to make sure last pap is normal before stopping.    Intermittent RLQ pain, maybe ovarian  Feels swollen like a swollen gland. Maybe some pressure.  No change in appetite, bladder or bowel function    Menopause in 40s  No bleeding since  No vaginal dryness  Not sexually active    Past Medical History:  Past Medical History:   Diagnosis Date    Cerebral infarction (H) 2019    Osteoporosis     Perianal abscess 2010       Past Surgical History:  Past Surgical History:   Procedure Laterality Date     SECTION      x2    COLONOSCOPY      DILATION AND CURETTAGE       Medications:  Current Outpatient Medications   Medication Sig Dispense Refill    aspirin (ASA) 81 MG chewable tablet Take 81 mg by mouth daily      atorvastatin (LIPITOR) 20 MG tablet Take 1 tablet (20 mg) by mouth daily. 90 tablet 3     Current Facility-Administered Medications   Medication Dose Route Frequency Provider Last Rate Last Admin    4 mL ropivacaine (NAROPIN) injection 5 mg/mL  4 mL      4 mL at 24 1347    lidocaine 1 % injection 3 mL  3 mL      3 mL at 24 1347    triamcinolone (KENALOG-40) injection 40 mg  40 mg      40 mg at 24 1347       Allergy:  Allergies   Allergen Reactions    Sulfa Antibiotics Unknown    Penicillins Hives and Rash         Physical Exam:  Vitals:    10/22/24 1429   BP: 103/60   Pulse: 67   Temp: 97.5  F (36.4  C)   Weight: 51.7 kg (114  lb)     Body mass index is 21.19 kg/m .    Gen: healthy, alert, active, no distress  Abd: soft, non-tender, non-distended, no masses  Extremities: nontender, no edema  :   - external genitalia: vulva and perineum are normal without lesion, mass or erythema  - urethra: well supported urethra, no hypermobility, normal Skenes and Bartholins.   - bladder: no tenderness, no masses  - vagina: narrow introitus. intact, smooth pale mucosa without lesions or abnormal discharge. No prolapse.   - cervix: nulliparous, atrophic, no lesions or abnormal discharge. Pap smear performed with some bleeding  - uterus: 6w size anteverted, no masses or tenderness  - adnexa: +slight fullness and tenderness in RLQ. No masses or tenderness on left  - rectal: deferred      Assessment:  Gladis Dunaway is a 64 year old postmenopausal female here for cervical cancer screening after unsatisfactory pap and no prior HPV testing. Pelvic pain and RLQ fullness.    Plan:  1. Pelvic pain in female  Recommend pelvic US to rule out ovarian cyst  - US Transvaginal Pelvic Non-OB; Future    2. Screening for malignant neoplasm of cervix  If normal, this can be her last pap  - HPV and Gynecologic Cytology Panel - Recommended Age 30-65 Years    3. Unsatisfactory cervical Papanicolaou smear  If normal, this can be her last pap  - HPV and Gynecologic Cytology Panel - Recommended Age 30-65 Years      Concetta Weller MD

## 2024-10-24 LAB
HPV HR 12 DNA CVX QL NAA+PROBE: NEGATIVE
HPV16 DNA CVX QL NAA+PROBE: NEGATIVE
HPV18 DNA CVX QL NAA+PROBE: NEGATIVE
HUMAN PAPILLOMA VIRUS FINAL DIAGNOSIS: NORMAL

## 2024-10-28 LAB
BKR AP ASSOCIATED HPV REPORT: NORMAL
BKR LAB AP GYN ADEQUACY: NORMAL
BKR LAB AP GYN INTERPRETATION: NORMAL
BKR LAB AP PREVIOUS ABNL DX: NORMAL
BKR LAB AP PREVIOUS ABNORMAL: NORMAL
PATH REPORT.COMMENTS IMP SPEC: NORMAL
PATH REPORT.COMMENTS IMP SPEC: NORMAL
PATH REPORT.RELEVANT HX SPEC: NORMAL

## 2024-11-18 ENCOUNTER — ANCILLARY PROCEDURE (OUTPATIENT)
Dept: ULTRASOUND IMAGING | Facility: CLINIC | Age: 64
End: 2024-11-18
Attending: OBSTETRICS & GYNECOLOGY
Payer: MEDICARE

## 2024-11-18 DIAGNOSIS — R10.2 PELVIC PAIN IN FEMALE: ICD-10-CM

## 2024-11-18 PROCEDURE — 76830 TRANSVAGINAL US NON-OB: CPT | Performed by: OBSTETRICS & GYNECOLOGY

## 2025-02-26 NOTE — PROGRESS NOTES
Chief Complaint   Patient presents with    Urgent Care    Arm Pain     C/O right arm pain after a fall 2 days ago     SUBJECTIVE:  Gladis Dunaway is a 63 year old female who presents to the clinic today with FOOSH with pain bruising limited range of motion.  No numbness tingling pallor cool sensation pulselessness.    No past medical history on file.  atorvastatin (LIPITOR) 20 MG tablet,     No current facility-administered medications on file prior to visit.    Social History     Tobacco Use    Smoking status: Never     Passive exposure: Never    Smokeless tobacco: Never   Substance Use Topics    Alcohol use: Not on file     Allergies   Allergen Reactions    Sulfa Antibiotics Unknown    Penicillins Hives and Rash       Review of Systems  All systems negative except for those listed above in HPI.    EXAM:   /73   Pulse 76   Temp 98.1  F (36.7  C) (Tympanic)   Wt 52.2 kg (115 lb)   SpO2 100%     Physical Exam  Vitals reviewed.   Constitutional:       Appearance: Normal appearance.   HENT:      Head: Normocephalic and atraumatic.   Cardiovascular:      Rate and Rhythm: Normal rate.      Pulses: Normal pulses.   Pulmonary:      Effort: Pulmonary effort is normal.   Musculoskeletal:         General: Swelling, tenderness and signs of injury present. No deformity.   Skin:     General: Skin is warm and dry.      Findings: Bruising and erythema present. No rash.   Neurological:      General: No focal deficit present.      Mental Status: She is alert and oriented to person, place, and time.      Sensory: No sensory deficit.   Psychiatric:         Mood and Affect: Mood normal.         Behavior: Behavior normal.       X-ray done in clinic read by me as positive for radial head fracture.  Results for orders placed or performed in visit on 08/13/23   XR Elbow Right G/E 3 Views     Status: None    Narrative    EXAM: XR ELBOW RIGHT G/E 3 VIEWS  LOCATION: [facility]  DATE:  8/13/2023    INDICATION: FOOSH with pain bruising limited range of motion  COMPARISON: None.      Impression    IMPRESSION: No fracture or dislocation. Moderate-sized joint effusion.   Results for orders placed or performed in visit on 08/13/23   XR Wrist Right G/E 3 Views     Status: None    Narrative    EXAM: XR WRIST RIGHT G/E 3 VIEWS  LOCATION: [facility]  DATE: 8/13/2023    INDICATION: FOOSH with pain bruising limited range of motion  COMPARISON: None.      Impression    IMPRESSION: No fracture. Mild ulnar minus variance. Normal carpal alignment.     ASSESSMENT:    ICD-10-CM    1. Closed nondisplaced fracture of head of right radius, initial encounter  S52.124A Orthopedic  Referral      2. Elbow injury, right, initial encounter  S59.901A XR Elbow Right G/E 3 Views      3. Wrist injury, right, initial encounter  S69.91XA XR Wrist Right G/E 3 Views        PLAN:    I see a radial head fracture, radiologist does not  Recommend follow-up with Ortho for second opinion  Rest ice compression elevate with sling  Rotate Tylenol ibuprofen    Follow up with primary care provider with any problems, questions or concerns or if symptoms worsen or fail to improve. Patient agreed to plan and verbalized understanding.    Iwona Riddle, HOLLIE-BC  [facility] [place]   [Normal Appearance] : normal appearance [General Appearance - In No Acute Distress] : no acute distress [Edema] : no peripheral edema [] : no respiratory distress [Exaggerated Use Of Accessory Muscles For Inspiration] : no accessory muscle use [Abdomen Soft] : soft [Abdomen Tenderness] : non-tender [Normal Station and Gait] : the gait and station were normal for the patient's age [Oriented To Time, Place, And Person] : oriented to person, place, and time [Affect] : the affect was normal

## 2025-03-17 ENCOUNTER — ANCILLARY PROCEDURE (OUTPATIENT)
Dept: CT IMAGING | Facility: CLINIC | Age: 65
End: 2025-03-17
Attending: INTERNAL MEDICINE
Payer: MEDICARE

## 2025-03-17 ENCOUNTER — OFFICE VISIT (OUTPATIENT)
Dept: URGENT CARE | Facility: URGENT CARE | Age: 65
End: 2025-03-17
Payer: MEDICARE

## 2025-03-17 ENCOUNTER — ANESTHESIA (OUTPATIENT)
Dept: SURGERY | Facility: CLINIC | Age: 65
End: 2025-03-17
Payer: MEDICARE

## 2025-03-17 ENCOUNTER — HOSPITAL ENCOUNTER (OUTPATIENT)
Facility: CLINIC | Age: 65
Discharge: HOME | End: 2025-03-18
Admitting: SURGERY
Payer: MEDICARE

## 2025-03-17 ENCOUNTER — ANESTHESIA EVENT (OUTPATIENT)
Dept: SURGERY | Facility: CLINIC | Age: 65
End: 2025-03-17
Payer: MEDICARE

## 2025-03-17 ENCOUNTER — OFFICE VISIT (OUTPATIENT)
Dept: PEDIATRICS | Facility: CLINIC | Age: 65
End: 2025-03-17
Payer: MEDICARE

## 2025-03-17 VITALS
BODY MASS INDEX: 21.49 KG/M2 | WEIGHT: 116.8 LBS | RESPIRATION RATE: 20 BRPM | HEART RATE: 94 BPM | OXYGEN SATURATION: 100 % | TEMPERATURE: 100 F | DIASTOLIC BLOOD PRESSURE: 73 MMHG | SYSTOLIC BLOOD PRESSURE: 116 MMHG | HEIGHT: 62 IN

## 2025-03-17 VITALS
BODY MASS INDEX: 21.75 KG/M2 | WEIGHT: 117 LBS | DIASTOLIC BLOOD PRESSURE: 64 MMHG | TEMPERATURE: 97.7 F | SYSTOLIC BLOOD PRESSURE: 104 MMHG | RESPIRATION RATE: 16 BRPM | HEART RATE: 73 BPM | OXYGEN SATURATION: 98 %

## 2025-03-17 DIAGNOSIS — K35.30 ACUTE APPENDICITIS WITH LOCALIZED PERITONITIS AND ABSCESS, WITHOUT GANGRENE OR PERFORATION: ICD-10-CM

## 2025-03-17 DIAGNOSIS — R10.31 RLQ ABDOMINAL PAIN: ICD-10-CM

## 2025-03-17 DIAGNOSIS — R10.31 RLQ ABDOMINAL PAIN: Primary | ICD-10-CM

## 2025-03-17 DIAGNOSIS — K35.80 ACUTE APPENDICITIS, UNSPECIFIED ACUTE APPENDICITIS TYPE: Primary | ICD-10-CM

## 2025-03-17 LAB
ANION GAP SERPL CALCULATED.3IONS-SCNC: 14 MMOL/L (ref 3–14)
BUN SERPL-MCNC: 11 MG/DL (ref 7–30)
CALCIUM SERPL-MCNC: 10.3 MG/DL (ref 8.5–10.1)
CHLORIDE BLD-SCNC: 104 MMOL/L (ref 94–109)
CO2 SERPL-SCNC: 27 MMOL/L (ref 20–32)
CREAT SERPL-MCNC: 0.7 MG/DL (ref 0.52–1.04)
EGFRCR SERPLBLD CKD-EPI 2021: >90 ML/MIN/1.73M2
ERYTHROCYTE [DISTWIDTH] IN BLOOD BY AUTOMATED COUNT: 13.1 % (ref 10–15)
GLUCOSE BLD-MCNC: 92 MG/DL (ref 70–99)
HCT VFR BLD AUTO: 39 % (ref 35–47)
HGB BLD-MCNC: 12.3 G/DL (ref 11.7–15.7)
MCH RBC QN AUTO: 30.1 PG (ref 26.5–33)
MCHC RBC AUTO-ENTMCNC: 31.5 G/DL (ref 31.5–36.5)
MCV RBC AUTO: 96 FL (ref 78–100)
PLATELET # BLD AUTO: 315 10E3/UL (ref 150–450)
POTASSIUM BLD-SCNC: 4.8 MMOL/L (ref 3.4–5.3)
RADIOLOGIST FLAGS: ABNORMAL
RBC # BLD AUTO: 4.08 10E6/UL (ref 3.8–5.2)
SODIUM SERPL-SCNC: 145 MMOL/L (ref 135–145)
WBC # BLD AUTO: 8.9 10E3/UL (ref 4–11)

## 2025-03-17 PROCEDURE — 3074F SYST BP LT 130 MM HG: CPT | Performed by: NURSE PRACTITIONER

## 2025-03-17 PROCEDURE — 272N000001 HC OR GENERAL SUPPLY STERILE: Performed by: SURGERY

## 2025-03-17 PROCEDURE — 3074F SYST BP LT 130 MM HG: CPT | Performed by: INTERNAL MEDICINE

## 2025-03-17 PROCEDURE — 88304 TISSUE EXAM BY PATHOLOGIST: CPT | Mod: TC | Performed by: SURGERY

## 2025-03-17 PROCEDURE — 710N000009 HC RECOVERY PHASE 1, LEVEL 1, PER MIN: Performed by: SURGERY

## 2025-03-17 PROCEDURE — 99214 OFFICE O/P EST MOD 30 MIN: CPT | Performed by: INTERNAL MEDICINE

## 2025-03-17 PROCEDURE — 3078F DIAST BP <80 MM HG: CPT | Performed by: INTERNAL MEDICINE

## 2025-03-17 PROCEDURE — 3078F DIAST BP <80 MM HG: CPT | Performed by: NURSE PRACTITIONER

## 2025-03-17 PROCEDURE — 258N000003 HC RX IP 258 OP 636: Performed by: ANESTHESIOLOGY

## 2025-03-17 PROCEDURE — 250N000009 HC RX 250: Performed by: ANESTHESIOLOGY

## 2025-03-17 PROCEDURE — 250N000011 HC RX IP 250 OP 636: Performed by: ANESTHESIOLOGY

## 2025-03-17 PROCEDURE — 360N000076 HC SURGERY LEVEL 3, PER MIN: Performed by: SURGERY

## 2025-03-17 PROCEDURE — 250N000009 HC RX 250: Performed by: INTERNAL MEDICINE

## 2025-03-17 PROCEDURE — 99285 EMERGENCY DEPT VISIT HI MDM: CPT | Mod: 25

## 2025-03-17 PROCEDURE — 1125F AMNT PAIN NOTED PAIN PRSNT: CPT | Performed by: NURSE PRACTITIONER

## 2025-03-17 PROCEDURE — 74177 CT ABD & PELVIS W/CONTRAST: CPT

## 2025-03-17 PROCEDURE — 250N000025 HC SEVOFLURANE, PER MIN: Performed by: SURGERY

## 2025-03-17 PROCEDURE — 99207 REFERRAL TO ACUTE AND DIAGNOSTIC SERVICES: CPT | Performed by: NURSE PRACTITIONER

## 2025-03-17 PROCEDURE — 80048 BASIC METABOLIC PNL TOTAL CA: CPT | Performed by: INTERNAL MEDICINE

## 2025-03-17 PROCEDURE — 250N000011 HC RX IP 250 OP 636: Mod: JZ | Performed by: PHYSICIAN ASSISTANT

## 2025-03-17 PROCEDURE — 250N000011 HC RX IP 250 OP 636: Performed by: INTERNAL MEDICINE

## 2025-03-17 PROCEDURE — 250N000009 HC RX 250: Performed by: SURGERY

## 2025-03-17 PROCEDURE — 250N000013 HC RX MED GY IP 250 OP 250 PS 637: Performed by: ANESTHESIOLOGY

## 2025-03-17 PROCEDURE — 250N000011 HC RX IP 250 OP 636: Performed by: PHYSICIAN ASSISTANT

## 2025-03-17 PROCEDURE — 250N000013 HC RX MED GY IP 250 OP 250 PS 637: Performed by: PHYSICIAN ASSISTANT

## 2025-03-17 PROCEDURE — 1125F AMNT PAIN NOTED PAIN PRSNT: CPT | Performed by: INTERNAL MEDICINE

## 2025-03-17 PROCEDURE — 250N000011 HC RX IP 250 OP 636: Mod: JW | Performed by: ANESTHESIOLOGY

## 2025-03-17 PROCEDURE — 999N000141 HC STATISTIC PRE-PROCEDURE NURSING ASSESSMENT: Performed by: SURGERY

## 2025-03-17 PROCEDURE — 370N000017 HC ANESTHESIA TECHNICAL FEE, PER MIN: Performed by: SURGERY

## 2025-03-17 PROCEDURE — 36415 COLL VENOUS BLD VENIPUNCTURE: CPT | Performed by: INTERNAL MEDICINE

## 2025-03-17 PROCEDURE — 85027 COMPLETE CBC AUTOMATED: CPT | Performed by: INTERNAL MEDICINE

## 2025-03-17 PROCEDURE — 258N000001 HC RX 258: Performed by: SURGERY

## 2025-03-17 RX ORDER — ATORVASTATIN CALCIUM 20 MG/1
20 TABLET, FILM COATED ORAL EVERY EVENING
Status: DISCONTINUED | OUTPATIENT
Start: 2025-03-18 | End: 2025-03-18 | Stop reason: HOSPADM

## 2025-03-17 RX ORDER — ONDANSETRON 4 MG/1
4 TABLET, ORALLY DISINTEGRATING ORAL EVERY 6 HOURS PRN
Status: DISCONTINUED | OUTPATIENT
Start: 2025-03-17 | End: 2025-03-18 | Stop reason: HOSPADM

## 2025-03-17 RX ORDER — OXYCODONE HYDROCHLORIDE 5 MG/1
5-10 TABLET ORAL EVERY 6 HOURS PRN
Qty: 8 TABLET | Refills: 0 | Status: SHIPPED | OUTPATIENT
Start: 2025-03-17 | End: 2025-03-20

## 2025-03-17 RX ORDER — KETOROLAC TROMETHAMINE 15 MG/ML
15 INJECTION, SOLUTION INTRAMUSCULAR; INTRAVENOUS EVERY 6 HOURS PRN
Status: DISCONTINUED | OUTPATIENT
Start: 2025-03-17 | End: 2025-03-18 | Stop reason: HOSPADM

## 2025-03-17 RX ORDER — METHOCARBAMOL 500 MG/1
500 TABLET, FILM COATED ORAL 3 TIMES DAILY
Status: DISCONTINUED | OUTPATIENT
Start: 2025-03-17 | End: 2025-03-18 | Stop reason: HOSPADM

## 2025-03-17 RX ORDER — HYDROMORPHONE HCL IN WATER/PF 6 MG/30 ML
0.2 PATIENT CONTROLLED ANALGESIA SYRINGE INTRAVENOUS
Status: DISCONTINUED | OUTPATIENT
Start: 2025-03-17 | End: 2025-03-18 | Stop reason: HOSPADM

## 2025-03-17 RX ORDER — NALOXONE HYDROCHLORIDE 0.4 MG/ML
0.4 INJECTION, SOLUTION INTRAMUSCULAR; INTRAVENOUS; SUBCUTANEOUS
Status: DISCONTINUED | OUTPATIENT
Start: 2025-03-17 | End: 2025-03-18 | Stop reason: HOSPADM

## 2025-03-17 RX ORDER — PROPOFOL 10 MG/ML
INJECTION, EMULSION INTRAVENOUS PRN
Status: DISCONTINUED | OUTPATIENT
Start: 2025-03-17 | End: 2025-03-17

## 2025-03-17 RX ORDER — METRONIDAZOLE 500 MG/100ML
500 INJECTION, SOLUTION INTRAVENOUS EVERY 8 HOURS
Status: DISCONTINUED | OUTPATIENT
Start: 2025-03-17 | End: 2025-03-18 | Stop reason: HOSPADM

## 2025-03-17 RX ORDER — ACETAMINOPHEN 325 MG/1
975 TABLET ORAL ONCE
Status: COMPLETED | OUTPATIENT
Start: 2025-03-17 | End: 2025-03-17

## 2025-03-17 RX ORDER — CIPROFLOXACIN 2 MG/ML
400 INJECTION, SOLUTION INTRAVENOUS EVERY 12 HOURS
Status: DISCONTINUED | OUTPATIENT
Start: 2025-03-17 | End: 2025-03-18 | Stop reason: HOSPADM

## 2025-03-17 RX ORDER — CALCIUM CARBONATE 500 MG/1
500-1000 TABLET, CHEWABLE ORAL 4 TIMES DAILY PRN
Status: DISCONTINUED | OUTPATIENT
Start: 2025-03-17 | End: 2025-03-18 | Stop reason: HOSPADM

## 2025-03-17 RX ORDER — DEXAMETHASONE SODIUM PHOSPHATE 4 MG/ML
INJECTION, SOLUTION INTRA-ARTICULAR; INTRALESIONAL; INTRAMUSCULAR; INTRAVENOUS; SOFT TISSUE PRN
Status: DISCONTINUED | OUTPATIENT
Start: 2025-03-17 | End: 2025-03-17

## 2025-03-17 RX ORDER — BUPIVACAINE HYDROCHLORIDE AND EPINEPHRINE 5; 5 MG/ML; UG/ML
INJECTION, SOLUTION PERINEURAL PRN
Status: DISCONTINUED | OUTPATIENT
Start: 2025-03-17 | End: 2025-03-17 | Stop reason: HOSPADM

## 2025-03-17 RX ORDER — LIDOCAINE HYDROCHLORIDE 20 MG/ML
INJECTION, SOLUTION INFILTRATION; PERINEURAL PRN
Status: DISCONTINUED | OUTPATIENT
Start: 2025-03-17 | End: 2025-03-17

## 2025-03-17 RX ORDER — PROPOFOL 10 MG/ML
INJECTION, EMULSION INTRAVENOUS CONTINUOUS PRN
Status: DISCONTINUED | OUTPATIENT
Start: 2025-03-17 | End: 2025-03-17

## 2025-03-17 RX ORDER — NALOXONE HYDROCHLORIDE 0.4 MG/ML
0.2 INJECTION, SOLUTION INTRAMUSCULAR; INTRAVENOUS; SUBCUTANEOUS
Status: DISCONTINUED | OUTPATIENT
Start: 2025-03-17 | End: 2025-03-18 | Stop reason: HOSPADM

## 2025-03-17 RX ORDER — OXYCODONE HYDROCHLORIDE 5 MG/1
10 TABLET ORAL EVERY 4 HOURS PRN
Status: DISCONTINUED | OUTPATIENT
Start: 2025-03-17 | End: 2025-03-18 | Stop reason: HOSPADM

## 2025-03-17 RX ORDER — PROCHLORPERAZINE MALEATE 5 MG/1
5 TABLET ORAL EVERY 6 HOURS PRN
Status: DISCONTINUED | OUTPATIENT
Start: 2025-03-17 | End: 2025-03-18 | Stop reason: HOSPADM

## 2025-03-17 RX ORDER — SODIUM CHLORIDE, SODIUM LACTATE, POTASSIUM CHLORIDE, CALCIUM CHLORIDE 600; 310; 30; 20 MG/100ML; MG/100ML; MG/100ML; MG/100ML
INJECTION, SOLUTION INTRAVENOUS CONTINUOUS
Status: DISCONTINUED | OUTPATIENT
Start: 2025-03-17 | End: 2025-03-17 | Stop reason: HOSPADM

## 2025-03-17 RX ORDER — BISACODYL 10 MG
10 SUPPOSITORY, RECTAL RECTAL DAILY PRN
Status: DISCONTINUED | OUTPATIENT
Start: 2025-03-17 | End: 2025-03-18 | Stop reason: HOSPADM

## 2025-03-17 RX ORDER — CLINDAMYCIN PHOSPHATE 900 MG/50ML
900 INJECTION, SOLUTION INTRAVENOUS EVERY 8 HOURS
Status: DISCONTINUED | OUTPATIENT
Start: 2025-03-17 | End: 2025-03-17

## 2025-03-17 RX ORDER — APREPITANT 40 MG/1
40 CAPSULE ORAL ONCE
Status: COMPLETED | OUTPATIENT
Start: 2025-03-17 | End: 2025-03-17

## 2025-03-17 RX ORDER — FENTANYL CITRATE 50 UG/ML
INJECTION, SOLUTION INTRAMUSCULAR; INTRAVENOUS PRN
Status: DISCONTINUED | OUTPATIENT
Start: 2025-03-17 | End: 2025-03-17

## 2025-03-17 RX ORDER — SODIUM CHLORIDE, SODIUM LACTATE, POTASSIUM CHLORIDE, CALCIUM CHLORIDE 600; 310; 30; 20 MG/100ML; MG/100ML; MG/100ML; MG/100ML
INJECTION, SOLUTION INTRAVENOUS CONTINUOUS
Status: DISCONTINUED | OUTPATIENT
Start: 2025-03-17 | End: 2025-03-18 | Stop reason: HOSPADM

## 2025-03-17 RX ORDER — ACETAMINOPHEN 325 MG/1
975 TABLET ORAL EVERY 6 HOURS
Status: DISCONTINUED | OUTPATIENT
Start: 2025-03-17 | End: 2025-03-18 | Stop reason: HOSPADM

## 2025-03-17 RX ORDER — DIPHENHYDRAMINE HCL 25 MG
25 CAPSULE ORAL EVERY 6 HOURS PRN
Status: DISCONTINUED | OUTPATIENT
Start: 2025-03-17 | End: 2025-03-18 | Stop reason: HOSPADM

## 2025-03-17 RX ORDER — ONDANSETRON 2 MG/ML
4 INJECTION INTRAMUSCULAR; INTRAVENOUS EVERY 6 HOURS PRN
Status: DISCONTINUED | OUTPATIENT
Start: 2025-03-17 | End: 2025-03-18 | Stop reason: HOSPADM

## 2025-03-17 RX ORDER — ONDANSETRON 2 MG/ML
INJECTION INTRAMUSCULAR; INTRAVENOUS PRN
Status: DISCONTINUED | OUTPATIENT
Start: 2025-03-17 | End: 2025-03-17

## 2025-03-17 RX ORDER — MAGNESIUM HYDROXIDE 1200 MG/15ML
LIQUID ORAL PRN
Status: DISCONTINUED | OUTPATIENT
Start: 2025-03-17 | End: 2025-03-17 | Stop reason: HOSPADM

## 2025-03-17 RX ORDER — SENNA AND DOCUSATE SODIUM 50; 8.6 MG/1; MG/1
1-2 TABLET, FILM COATED ORAL 2 TIMES DAILY PRN
Qty: 15 TABLET | Refills: 0 | Status: SHIPPED | OUTPATIENT
Start: 2025-03-17

## 2025-03-17 RX ORDER — SIMETHICONE 80 MG
80 TABLET,CHEWABLE ORAL 4 TIMES DAILY PRN
Status: DISCONTINUED | OUTPATIENT
Start: 2025-03-17 | End: 2025-03-18 | Stop reason: HOSPADM

## 2025-03-17 RX ORDER — MULTIVITAMIN
1 TABLET ORAL DAILY
COMMUNITY

## 2025-03-17 RX ORDER — DIPHENHYDRAMINE HYDROCHLORIDE 50 MG/ML
25 INJECTION, SOLUTION INTRAMUSCULAR; INTRAVENOUS EVERY 6 HOURS PRN
Status: DISCONTINUED | OUTPATIENT
Start: 2025-03-17 | End: 2025-03-18 | Stop reason: HOSPADM

## 2025-03-17 RX ORDER — SODIUM CHLORIDE, SODIUM LACTATE, POTASSIUM CHLORIDE, CALCIUM CHLORIDE 600; 310; 30; 20 MG/100ML; MG/100ML; MG/100ML; MG/100ML
INJECTION, SOLUTION INTRAVENOUS CONTINUOUS PRN
Status: DISCONTINUED | OUTPATIENT
Start: 2025-03-17 | End: 2025-03-17

## 2025-03-17 RX ORDER — AMOXICILLIN 250 MG
1-2 CAPSULE ORAL 2 TIMES DAILY
Status: DISCONTINUED | OUTPATIENT
Start: 2025-03-17 | End: 2025-03-18 | Stop reason: HOSPADM

## 2025-03-17 RX ORDER — IOPAMIDOL 755 MG/ML
57 INJECTION, SOLUTION INTRAVASCULAR ONCE
Status: COMPLETED | OUTPATIENT
Start: 2025-03-17 | End: 2025-03-17

## 2025-03-17 RX ORDER — HYDROMORPHONE HCL IN WATER/PF 6 MG/30 ML
0.4 PATIENT CONTROLLED ANALGESIA SYRINGE INTRAVENOUS
Status: DISCONTINUED | OUTPATIENT
Start: 2025-03-17 | End: 2025-03-18 | Stop reason: HOSPADM

## 2025-03-17 RX ORDER — OXYCODONE HYDROCHLORIDE 5 MG/1
5 TABLET ORAL EVERY 4 HOURS PRN
Status: DISCONTINUED | OUTPATIENT
Start: 2025-03-17 | End: 2025-03-18 | Stop reason: HOSPADM

## 2025-03-17 RX ORDER — POLYETHYLENE GLYCOL 3350 17 G/17G
17 POWDER, FOR SOLUTION ORAL 2 TIMES DAILY
Status: DISCONTINUED | OUTPATIENT
Start: 2025-03-17 | End: 2025-03-18 | Stop reason: HOSPADM

## 2025-03-17 RX ADMIN — SENNOSIDES AND DOCUSATE SODIUM 1 TABLET: 50; 8.6 TABLET ORAL at 21:44

## 2025-03-17 RX ADMIN — ROCURONIUM BROMIDE 50 MG: 50 INJECTION, SOLUTION INTRAVENOUS at 16:26

## 2025-03-17 RX ADMIN — FENTANYL CITRATE 100 MCG: 50 INJECTION INTRAMUSCULAR; INTRAVENOUS at 16:26

## 2025-03-17 RX ADMIN — APREPITANT 40 MG: 40 CAPSULE ORAL at 16:03

## 2025-03-17 RX ADMIN — POLYETHYLENE GLYCOL 3350 17 G: 17 POWDER, FOR SOLUTION ORAL at 21:44

## 2025-03-17 RX ADMIN — METRONIDAZOLE 500 MG: 500 INJECTION, SOLUTION INTRAVENOUS at 15:52

## 2025-03-17 RX ADMIN — ACETAMINOPHEN 975 MG: 325 TABLET, FILM COATED ORAL at 21:43

## 2025-03-17 RX ADMIN — SODIUM CHLORIDE, SODIUM LACTATE, POTASSIUM CHLORIDE, AND CALCIUM CHLORIDE: .6; .31; .03; .02 INJECTION, SOLUTION INTRAVENOUS at 16:22

## 2025-03-17 RX ADMIN — ACETAMINOPHEN 975 MG: 325 TABLET, FILM COATED ORAL at 16:03

## 2025-03-17 RX ADMIN — DEXAMETHASONE SODIUM PHOSPHATE 4 MG: 4 INJECTION, SOLUTION INTRA-ARTICULAR; INTRALESIONAL; INTRAMUSCULAR; INTRAVENOUS; SOFT TISSUE at 16:43

## 2025-03-17 RX ADMIN — SODIUM CHLORIDE, SODIUM LACTATE, POTASSIUM CHLORIDE, AND CALCIUM CHLORIDE: .6; .31; .03; .02 INJECTION, SOLUTION INTRAVENOUS at 16:03

## 2025-03-17 RX ADMIN — PROPOFOL 50 MCG/KG/MIN: 10 INJECTION, EMULSION INTRAVENOUS at 16:45

## 2025-03-17 RX ADMIN — MIDAZOLAM 2 MG: 1 INJECTION INTRAMUSCULAR; INTRAVENOUS at 16:22

## 2025-03-17 RX ADMIN — HYDROMORPHONE HYDROCHLORIDE 0.5 MG: 1 INJECTION, SOLUTION INTRAMUSCULAR; INTRAVENOUS; SUBCUTANEOUS at 16:56

## 2025-03-17 RX ADMIN — LIDOCAINE HYDROCHLORIDE 60 MG: 20 INJECTION, SOLUTION INFILTRATION; PERINEURAL at 16:26

## 2025-03-17 RX ADMIN — PHENYLEPHRINE HYDROCHLORIDE 100 MCG: 10 INJECTION INTRAVENOUS at 16:41

## 2025-03-17 RX ADMIN — PROPOFOL 200 MG: 10 INJECTION, EMULSION INTRAVENOUS at 16:26

## 2025-03-17 RX ADMIN — SODIUM CHLORIDE 40 ML: 9 INJECTION, SOLUTION INTRAVENOUS at 14:06

## 2025-03-17 RX ADMIN — PHENYLEPHRINE HYDROCHLORIDE 100 MCG: 10 INJECTION INTRAVENOUS at 18:01

## 2025-03-17 RX ADMIN — Medication 200 MG: at 17:58

## 2025-03-17 RX ADMIN — CIPROFLOXACIN 400 MG: 2 INJECTION, SOLUTION INTRAVENOUS at 16:40

## 2025-03-17 RX ADMIN — IOPAMIDOL 57 ML: 755 INJECTION, SOLUTION INTRAVENOUS at 14:06

## 2025-03-17 RX ADMIN — METHOCARBAMOL 500 MG: 500 TABLET ORAL at 21:43

## 2025-03-17 RX ADMIN — ONDANSETRON 4 MG: 2 INJECTION INTRAMUSCULAR; INTRAVENOUS at 17:14

## 2025-03-17 ASSESSMENT — ACTIVITIES OF DAILY LIVING (ADL)
ADLS_ACUITY_SCORE: 41
ADLS_ACUITY_SCORE: 55
ADLS_ACUITY_SCORE: 41

## 2025-03-17 ASSESSMENT — COLUMBIA-SUICIDE SEVERITY RATING SCALE - C-SSRS
1. IN THE PAST MONTH, HAVE YOU WISHED YOU WERE DEAD OR WISHED YOU COULD GO TO SLEEP AND NOT WAKE UP?: NO
6. HAVE YOU EVER DONE ANYTHING, STARTED TO DO ANYTHING, OR PREPARED TO DO ANYTHING TO END YOUR LIFE?: NO
2. HAVE YOU ACTUALLY HAD ANY THOUGHTS OF KILLING YOURSELF IN THE PAST MONTH?: NO

## 2025-03-17 ASSESSMENT — PAIN SCALES - GENERAL
PAINLEVEL_OUTOF10: MODERATE PAIN (6)
PAINLEVEL_OUTOF10: MODERATE PAIN (6)

## 2025-03-17 NOTE — PATIENT INSTRUCTIONS
Rach ADS for further eval and management  Right lower quadrant abdominal pain  Urgent care limited without ultrasound CT scan  Differentials include gas pain constipation appendicitis ovarian cyst mass UTI pyelonephritis kidney stone  N.p.o. nothing by mouth until further advised  Patient will go now by car

## 2025-03-17 NOTE — ED TRIAGE NOTES
Presents ambulatory with  with complaint of RLQ abdominal pain. Patient had imaging done showing appendicitis and was sent to this hospital for surgery.

## 2025-03-17 NOTE — PROGRESS NOTES
Referral to Acute and Diagnostic Services    [phone] (Rach) Rach- [address] [facility], [address], [place] [address]    Transition to Acute & Diagnostic Services Clinic has been discussed with patient, and she agrees with next level of care.   Patient understands that evaluation/treatment at [facility] typically takes significantly longer than in clinic/urgent care (>2 hours).  The [facility] Acute and Diagnostics Services Clinic has been contacted by provider/staff to confirm patient acceptance.         Special issues:      None                     The following provider has assessed this patient for intervention at [facility], and directed the patient for referral: Iwona Riddle, NP

## 2025-03-17 NOTE — PROGRESS NOTES
Acute and Diagnostic Services Clinic Visit    Assessment & Plan     RLQ abdominal pain  - CBC with platelets; Future  - Basic metabolic panel; Future  - CT Abdomen Pelvis w Contrast; Future  - sodium chloride (PF) 0.9% PF flush 3 mL  - CBC with platelets  - Basic metabolic panel    Acute appendicitis with localized peritonitis and abscess, without perforation,  or gangrene    CBC without leucocytosis and BMP normal. Discussed CT scan with radiologist. CT shows acute appendicitis with likely abscess but without perforation. She does have a fever and localized peritonitis on exam. Discussed treatment options that all require inpatient admission for IV antibiotics and/or surgery. No bed immediately available so patient transferred to [facility] ED for ongoing care                No follow-ups on file.    Moe Griffith is a 64 year old, presenting for the following health issues:  Abdominal Pain (Patient is here with RLQ pain.  Rates pain level 6./)    HPI        She has had five days of RLQ pain, not getting better or worse overall. Pain is worse after eating with her normal meals. Ate smaller portion sizes over the weekend which helped. She has been having BM's, normal caliber but not feeling like she is able to fully evacuate. Some pressure with BM's but no pain.    Last colonoscopy was > 10 years ago, told it was normal. Two years ago did a stool test instead.    No fevers or chills    Abdominal/Flank Pain  Onset/Duration: RLQ pain x 5 days   Description:   Character: Sharp and Stabbing  Location: right lower quadrant  Radiation: None  Intensity: moderate  Progression of Symptoms:  same  Accompanying Signs & Symptoms:  Fever/chills: no   Gas/Bloating: YES  Nausea: no   Vomitting: no   Diarrhea: no   Constipation:YES  Dysuria: no            Hematuria: no            Frequency: no            Incontinence of urine: no   History:            Last bowel movement: today  Trauma: no   Previous similar pain: YES   Previous  "tests done: Fall '24  vaginal US           Previous Abdominal surgery: YES- 2 C-sections and one D&C  Precipitating factors:   Does the pain change with:     Food: YES- Pain increases with eating     Bowel Movement: no     Urination: no              Other factors: no   Therapies tried and outcome:  None    When food last eaten: 7 PM 23/16/2025            Objective    /73 (BP Location: Right arm, Patient Position: Sitting, Cuff Size: Adult Regular)   Pulse 94   Temp 100  F (37.8  C) (Temporal)   Resp 20   Ht 1.562 m (5' 1.5\")   Wt 53 kg (116 lb 12.8 oz)   LMP  (LMP Unknown)   SpO2 100%   BMI 21.71 kg/m    Body mass index is 21.71 kg/m .  Physical Exam     Well appearing  RRR  CTAB  Soft, TTP over RLQ  No LE Edema            Signed Electronically by: Debra Bhakta MD    "

## 2025-03-17 NOTE — ED PROVIDER NOTES
Emergency Department Note      History of Present Illness     Chief Complaint   Abdominal Pain      HPI   Gladis Dunaway is a 64 year old female with history of CVA who presents for evaluation of right lower quadrant abdominal pain.  Patient states symptom started 5 days ago.  She describes a sharp sensation in the right lower quadrant of her abdomen.  Developed fever and chills today.  No other symptoms such as nausea or vomiting, chest pain, shortness of breath, diarrhea or constipation, or urinary symptoms.  Patient was seen at [facility] urgent care today, had a CT abdomen pelvis which was significant for an acute appendicitis and was told to go to the emergency department.  General surgery was already aware of this patient, orders were placed for clindamycin.  Patient states only surgical history was 2 prior C-sections.  Reports her last oral intake was last night, no food today.    Independent Historian   None    Review of External Notes   3/17/2025 reviewed CT abdomen/pelvis, appendix is dilated up to 10 mm with surrounding stranding consistent with acute appendicitis.    Past Medical History     Medical History and Problem List   Past Medical History:   Diagnosis Date    Cerebral infarction (H) 2019    Osteoporosis     Perianal abscess 2010       Medications   oxyCODONE (ROXICODONE) 5 MG tablet  SENNA-docusate sodium (SENNA S) 8.6-50 MG tablet        Surgical History   Past Surgical History:   Procedure Laterality Date     SECTION      x2    COLONOSCOPY      DILATION AND CURETTAGE         Physical Exam     Patient Vitals for the past 24 hrs:   BP Temp Temp src Pulse Resp SpO2 Height Weight   25 109/62 98.5  F (36.9  C) Oral 80 16 96 % -- --   25 -- -- -- 88 13 93 % -- --   25 89/57 96.8  F (36  C) -- 92 10 94 % -- --   25 96/51 96.8  F (36  C) -- 80 (!) 6 93 % -- --   25 105/61 96.8  F (36  C) -- 82 13 95 % -- --   25  "107/59 96.8  F (36  C) -- 82 (!) 9 95 % -- --   03/17/25 1845 118/64 (!) 96.4  F (35.8  C) -- 85 13 98 % -- --   03/17/25 1830 116/86 (!) 96.3  F (35.7  C) -- 88 11 100 % -- --   03/17/25 1815 (!) 142/74 (!) 96.1  F (35.6  C) -- 103 19 99 % -- --   03/17/25 1812 109/90 -- -- 95 11 100 % -- --   03/17/25 1610 -- -- -- -- -- -- 1.549 m (5' 1\") 53.1 kg (117 lb)   03/17/25 1522 (!) 142/71 97.8  F (36.6  C) Temporal 96 18 97 % -- --     Physical Exam  Physical Exam:  GENERAL: Warm, dry, alert, no increased work of breathing, standing in room without acute distress, nontoxic-appearing  HEENT: No scleral icterus, clear conjunctiva  NECK:  No stiffness or restricted range of motion  HEART: Regular rate and rhythm, no murmur or rubs  LUNGS: CTAB, moving air well.  No crackles or wheezes are heard.  ABD: Right lower quadrant tenderness with guarding, soft, nondistended, with good bowel sounds heard.  BACK: No obvious deformities  EXTREMITIES: Moves all extremities without difficulty.  SKIN: Warm and dry without rash or lesions.  NEUROLOGICAL: No focal deficits.    PSYCH: Appropriate mood and affect.      Diagnostics     Lab Results   Labs Ordered and Resulted from Time of ED Arrival to Time of ED Departure - No data to display    Imaging   No orders to display           Independent Interpretation   None    ED Course      Medications Administered   Medications   metroNIDAZOLE (FLAGYL) infusion 500 mg (500 mg Intravenous $New Bag 3/17/25 1552)   ciprofloxacin (CIPRO) infusion 400 mg (400 mg Intravenous $Given 3/17/25 1640)   atorvastatin (LIPITOR) tablet 20 mg (has no administration in time range)   sodium chloride (PF) 0.9% PF flush 3 mL (has no administration in time range)   sodium chloride (PF) 0.9% PF flush 3 mL (has no administration in time range)   sodium chloride (PF) 0.9% PF flush 3 mL (has no administration in time range)   lactated ringers infusion (0 mLs Intravenous Stopped 3/17/25 2294)   HYDROmorphone (DILAUDID) " injection 0.2 mg (has no administration in time range)     Or   HYDROmorphone (DILAUDID) injection 0.4 mg (has no administration in time range)   oxyCODONE (ROXICODONE) tablet 5 mg (has no administration in time range)     Or   oxyCODONE (ROXICODONE) tablet 10 mg (has no administration in time range)   ketorolac (TORADOL) injection 15 mg (has no administration in time range)   acetaminophen (TYLENOL) tablet 975 mg (975 mg Oral $Given 3/17/25 2143)   ondansetron (ZOFRAN ODT) ODT tab 4 mg (has no administration in time range)     Or   ondansetron (ZOFRAN) injection 4 mg (has no administration in time range)   prochlorperazine (COMPAZINE) injection 5 mg (has no administration in time range)     Or   prochlorperazine (COMPAZINE) tablet 5 mg (has no administration in time range)   calcium carbonate (TUMS) chewable tablet 500-1,000 mg (has no administration in time range)   senna-docusate (SENOKOT-S/PERICOLACE) 8.6-50 MG per tablet 1-2 tablet (1 tablet Oral $Given 3/17/25 2144)   polyethylene glycol (MIRALAX) Packet 17 g (17 g Oral $Given 3/17/25 2144)   methocarbamol (ROBAXIN) tablet 500 mg (500 mg Oral $Given 3/17/25 2143)   bisacodyl (DULCOLAX) suppository 10 mg (has no administration in time range)   simethicone (MYLICON) chewable tablet 80 mg (has no administration in time range)   diphenhydrAMINE (BENADRYL) capsule 25 mg (has no administration in time range)     Or   diphenhydrAMINE (BENADRYL) injection 25 mg (has no administration in time range)   melatonin tablet 3 mg (has no administration in time range)   naloxone (NARCAN) injection 0.2 mg (has no administration in time range)     Or   naloxone (NARCAN) injection 0.4 mg (has no administration in time range)     Or   naloxone (NARCAN) injection 0.2 mg (has no administration in time range)     Or   naloxone (NARCAN) injection 0.4 mg (has no administration in time range)   acetaminophen (TYLENOL) tablet 975 mg (975 mg Oral $Given 3/17/25 5976)   aprepitant (EMEND)  capsule 40 mg (40 mg Oral $Given 3/17/25 4954)       Procedures   Procedures     Discussion of Management   Staffed with Dr. Sarmiento  ED Course   ED Course as of 03/17/25 2154   Mon Mar 17, 2025   1520 I staffed this patient with Dr. Sarmiento given the need for admission with surgery       Additional Documentation  None    Medical Decision Making / Diagnosis     CMS Diagnoses: None    MIPS       None    [facility]   Gladis Dunaway is a 64 year old female who presents for evaluation of right lower quadrant pain.  Patient already received a CT abdomen/pelvis ordered by urgent care which was significant for an acute appendicitis.  Provider in urgent care had already spoken with general surgery at [facility].  Patient was instructed to go to the emergency department to be made ready for the OR.  Orders had already been placed by general surgery.  I reviewed CBC and BMP from today, there is no leukocytosis nor anemia, no significant metabolic derangements.  Vitals were reassuring at presentation without fever.  Patient did have right lower quadrant tenderness with guarding otherwise she was not toxic appearing.  Clindamycin had already been ordered by general surgery, she was provided this medication while in the ED.  Patient was subsequently sent to the OR.    Disposition   The patient was admitted to the hospital.     Diagnosis     ICD-10-CM    1. Acute appendicitis, unspecified acute appendicitis type  K35.80 Case Request: APPENDECTOMY, LAPAROSCOPIC     Case Request: APPENDECTOMY, LAPAROSCOPIC     oxyCODONE (ROXICODONE) 5 MG tablet     SENNA-docusate sodium (SENNA S) 8.6-50 MG tablet           Discharge Medications   Current Discharge Medication List        START taking these medications    Details   oxyCODONE (ROXICODONE) 5 MG tablet Take 1-2 tablets (5-10 mg) by mouth every 6 hours as needed for moderate to severe pain.  Qty: 8 tablet, Refills: 0    Associated Diagnoses: Acute appendicitis, unspecified acute  appendicitis type      SENNA-docusate sodium (SENNA S) 8.6-50 MG tablet Take 1-2 tablets by mouth 2 times daily as needed (constipation).  Qty: 15 tablet, Refills: 0    Associated Diagnoses: Acute appendicitis, unspecified acute appendicitis type               KELLEN Platt John, PA-C  03/17/25 0483

## 2025-03-17 NOTE — ED NOTES
Bed: ED21  Expected date:   Expected time:   Means of arrival:   Comments:  [place]- alma rosa BROWNE

## 2025-03-17 NOTE — PROGRESS NOTES
Chief Complaint   Patient presents with    Urgent Care     Persistent sharp pain since Thursday right lower quadrant. Tender to the touch, feels like its burning on the inside and swollen.  Able to have BM but doesn't feel relief after, denies an abnormal BM appearance. Denies fevers. Reports cold symptoms ongoing for week. Pain scale 6     SUBJECTIVE:  Gladis Dunaway is a 64 year old female who presents today with right lower quadrant abdominal pain over the past 5 days.  She rates the pain a 6/10 constant and worse sharp burning pressure when eating and touching the area.  Feels a little constipated, but had a relatively normal BM today.  No nausea vomiting fever sweats chills dysuria flank pain hematuria.  Relevant past medical history of multiple C-sections and a D&C.  She had a pelvic ultrasound in the fall which was unremarkable.  She tells me that over the last year or so she has had some intermittent indigestion when bearing down for bowel movements she feels a little nauseous.    Past Medical History:   Diagnosis Date    Cerebral infarction (H) August 2019    Osteoporosis     Perianal abscess 12/16/2010     Current Outpatient Medications   Medication Sig Dispense Refill    aspirin (ASA) 81 MG chewable tablet Take 81 mg by mouth daily      atorvastatin (LIPITOR) 20 MG tablet Take 1 tablet (20 mg) by mouth daily. 90 tablet 3     Social History     Tobacco Use    Smoking status: Never     Passive exposure: Never    Smokeless tobacco: Never   Substance Use Topics    Alcohol use: Not Currently     Allergies   Allergen Reactions    Sulfa Antibiotics Unknown    Penicillins Hives and Rash     Review of Systems   ROS: 10 point ROS neg other than the symptoms noted above in the HPI.    OBJECTIVE:  /64   Pulse 73   Temp 97.7  F (36.5  C) (Temporal)   Resp 16   Wt 53.1 kg (117 lb)   LMP  (LMP Unknown)   SpO2 98%   BMI 21.75 kg/m      Physical Exam  Vitals reviewed.   Constitutional:       General: She is  not in acute distress.     Appearance: Normal appearance. She is well-developed. She is not ill-appearing, toxic-appearing or diaphoretic.   Cardiovascular:      Pulses: Normal pulses.   Pulmonary:      Effort: Pulmonary effort is normal.   Abdominal:      General: Bowel sounds are normal. There is no distension.      Palpations: Abdomen is soft. There is no mass.      Tenderness: There is abdominal tenderness (mcburneys + umbilical tenderness). There is no right CVA tenderness, left CVA tenderness, guarding or rebound.      Hernia: No hernia is present.   Musculoskeletal:         General: Normal range of motion.   Skin:     General: Skin is warm and dry.      Capillary Refill: Capillary refill takes less than 2 seconds.      Coloration: Skin is not jaundiced or pale.   Neurological:      General: No focal deficit present.      Mental Status: She is alert and oriented to person, place, and time.      Motor: No weakness.      Gait: Gait normal.   Psychiatric:         Mood and Affect: Mood normal.         Behavior: Behavior normal.       ASSESSMENT:    ICD-10-CM    1. RLQ abdominal pain  R10.31         PLAN:     [place] ADS for further eval and management  Right lower quadrant abdominal pain  Urgent care limited without ultrasound CT scan  Differentials include gas pain constipation appendicitis ovarian cyst mass UTI pyelonephritis kidney stone  N.p.o. nothing by mouth until further advised  Patient will go now by car    Follow up with primary care provider with any problems, questions or concerns or if symptoms worsen or fail to improve. Patient agreed to plan and verbalized understanding.    Iwona Riddle, HOLLIE-BC  [facility] [place]

## 2025-03-17 NOTE — ANESTHESIA PROCEDURE NOTES
Airway       Patient location during procedure: OR       Procedure Start/Stop Times: 3/17/2025 4:30 PM  Staff -        Anesthesiologist:  Mike Gu DO       CRNA: Darinel Moralez APRN CRNA       Performed By: CRNA  Consent for Airway        Urgency: elective  Indications and Patient Condition       Indications for airway management: paulie-procedural       Induction type:intravenous       Mask difficulty assessment: 0 - not attempted    Final Airway Details       Final airway type: endotracheal airway       Successful airway: ETT - single  Endotracheal Airway Details        ETT size (mm): 7.0       Cuffed: yes       Successful intubation technique: direct laryngoscopy and video laryngoscopy       VL Blade Size: Glidescope 3       Grade View of Cords: 1       Adjucts: stylet       Position: Right       Measured from: lips       Secured at (cm): 22       Bite block used: None    Post intubation assessment        Placement verified by: capnometry, equal breath sounds and chest rise        Number of attempts at approach: 3       Number of other approaches attempted: 0       Secured with: tape       Ease of procedure: easy       Dentition: Intact and Unchanged    Medication(s) Administered   Medication Administration Time: 3/17/2025 4:30 PM    Additional Comments       DL x2 with mac 3 blade. Gr 2. Difficult to intubate with DL. Easy intubation with Glidescope #3

## 2025-03-17 NOTE — ANESTHESIA CARE TRANSFER NOTE
Patient: Gladis Dunaway    Procedure: Procedure(s):  APPENDECTOMY, LAPAROSCOPIC       Diagnosis: Acute appendicitis, unspecified acute appendicitis type [K35.80]  Diagnosis Additional Information: No value filed.    Anesthesia Type:   General     Note:    Oropharynx: oropharynx clear of all foreign objects  Level of Consciousness: awake  Oxygen Supplementation: face mask    Independent Airway: airway patency satisfactory and stable  Dentition: dentition unchanged  Vital Signs Stable: post-procedure vital signs reviewed and stable  Report to RN Given: handoff report given  Patient transferred to: PACU    Handoff Report: Identifed the Patient, Identified the Reponsible Provider, Reviewed the pertinent medical history, Discussed the surgical course, Reviewed Intra-OP anesthesia mangement and issues during anesthesia, Set expectations for post-procedure period and Allowed opportunity for questions and acknowledgement of understanding      Electronically Signed By: BRANDI Lala CRNA  March 17, 2025  6:10 PM

## 2025-03-17 NOTE — H&P
[facility] Surgery H&P- updated if indicated.    Gladis Dunaway MRN# [id number]     Age: 64 year old   YOB: 1960       Date of Admission: 3/17/2025  3:14 PM      Reason for consult: appendicitis       Requesting physician: Yosef Barrientos       Level of consult: Consult, follow and place orders      Assessment/Plan/Recommendations:    This 64 year old year old female has acute appendicitis.    Concerns not limited to bleeding, infection/abscess, extended resection, visceral injury, need for open surgery, drain placement were discussed.           Chief Complaint:     5 days of rlq abd pain. Sharp. 6/10. CT suggests appendicitis with abscess.          Past Medical History:     Past Medical History:   Diagnosis Date    Cerebral infarction (H) 2019    Osteoporosis     Perianal abscess 2010             Past Surgical History:     Past Surgical History:   Procedure Laterality Date     SECTION      x2    COLONOSCOPY  2015    DILATION AND CURETTAGE                  Family History:     Family History   Problem Relation Age of Onset    Breast Cancer Mother         dx in 60's    Anxiety Disorder Mother     Coronary Artery Disease Father     Hyperlipidemia Father                 Medications:     Current Facility-Administered Medications   Medication Dose Route Frequency Provider Last Rate Last Admin    clindamycin (CLEOCIN) 900 mg in 50 mL D5W intermittent infusion  900 mg Intravenous Q8H Naldo Jiang MD        sodium chloride (PF) 0.9% PF flush 3 mL  3 mL Intravenous q1 min prn          Current Outpatient Medications   Medication Sig Dispense Refill    aspirin (ASA) 81 MG chewable tablet Take 81 mg by mouth daily      atorvastatin (LIPITOR) 20 MG tablet Take 1 tablet (20 mg) by mouth daily. 90 tablet 3             Review of Systems:     Review of system is negative but for HPI.    Abdomen is nd.          Data:     CT and labs reviewed.       Naldo Jiang MD

## 2025-03-17 NOTE — DISCHARGE INSTRUCTIONS
[facility]  Discharge Instructions: Post-Operative Appendectomy    ACTIVITY  Expect to feel tired after your surgery.  This will gradually resolve.    Take frequent, short walks and increase your activity gradually.    Avoid strenuous physical activity or heavy lifting greater than 15-20 lbs. for 2-3 weeks.  You may climb stairs.  You may drive without restrictions when you are not using any prescription pain medication and feel comfortable in a car.  You may return to work/school when you are comfortable without any prescription pain medication.    WOUND CARE  You may remove your outer dressing or Band-Aids and shower 48 hours after the surgery.  Pat your incisions dry and leave them open to air.  Re-apply dressing (Band-Aids or gauze/tape) as needed for comfort or drainage.  You have skin glue on your incisions. Do not pick at the glue; it will slowly peel up and fall off on its own.   Do not soak your incisions in a tub or pool for 2 weeks.   Do not apply any lotions, creams, or ointments to your incisions.  A ridge under your incisions is normal and will gradually resolve.    DIET  Start with liquids, then gradually resume your regular diet as tolerated.  Avoid heavy, spicy, and greasy meals for 2-3 days.  Drink plenty of fluids to stay hydrated.    PAIN  Expect some tenderness and discomfort at the incision sites.  Use the prescribed pain medication at your discretion.  Expect gradual resolution of your pain over several days.  You may take ibuprofen with food (unless you have been told not to) or acetaminophen/Tylenol instead of or in addition to your prescribed pain medication.     Do not drink alcohol or drive while you are taking pain medications.  You may apply ice to your incisions in 20 minute intervals as needed for the next 48 hours.  After that time, consider switching to heat if you prefer.    EXPECTATIONS  Pain medications can cause constipation.  Limit use when  possible.  Take an over the counter or prescribed stool softener/stimulant, such as Senna-Docusate 1-2 times a day with plenty of water.  You may take a mild over the counter laxative, such as Miralax or a Dulcolax suppository, as needed.  You make discontinue these medications once you are having regular bowel movements and/or are no longer taking your narcotic pain medication.   You may have shoulder or upper back discomfort due to the gas used in surgery.  This is temporary and should resolve in 48-72 hours.  Short, frequent walks may help with this.  If you are unable to urinate for 8 hours or feel as though you are not emptying your bladder adequately, we recommend you seek care at an ER or Urgent Care facility for possible catheter placement.     FOLLOW UP  The surgical office will contact you in approximately 2-3 weeks to check on your progress and answer any questions you may have.  If you are doing well, you will not need to return for a follow up appointment.  If any concerns are identified over the phone, we will help you make an appointment to see a provider.   If you have not received a phone call, have any questions or concerns, or would like to be seen, please call us at [phone] and ask to speak with our nurse.  We are located at [address] [place] [address].    CALL OUR OFFICE -[phone] IF YOU HAVE:   Chills or fever above 101 F.  Increased redness, warmth, or drainage at your incisions.  Significant bleeding.  Pain not relieved by your pain medication or rest.  Increasing pain after the first 48 hours.  Any other concerns or questions.

## 2025-03-17 NOTE — ED PROVIDER NOTES
Emergency Department Attending Supervisory Note  3/17/2025  3:20 PM      I evaluated this patient with JANETH Platt.       Briefly, the patient presented with several days of abdominal pain had outpatient CT scan demonstrating acute appendicitis. Screen labs within normal limits PTA.        Workup is notable for:  No orders to display     Labs Ordered and Resulted from Time of ED Arrival to Time of ED Departure - No data to display      In summary, my impression is uncomplicated appendicitis, will plan for operative intervention.  Surgeon has ordered clindamycin given her antibiotic allergy.      ICD-10-CM    1. Acute appendicitis, unspecified acute appendicitis type  K35.80 Case Request: APPENDECTOMY, LAPAROSCOPIC     Case Request: APPENDECTOMY, LAPAROSCOPIC              Disposition:  Send to OR    Yosef Sarmiento MD  [facility]  [facility]    3:20 PM 03/17/25           Yosef Sarmiento MD  03/17/25 1525

## 2025-03-17 NOTE — PROCEDURES
OPERATIVE NOTE:     PREOPERATIVE DIAGNOSIS: Acute Appendicitis     POSTOPERATIVE DIAGNOSIS: same.     PROCEDURE PERFORMED: Laparoscopic appendectomy     SURGEON: Naldo Jiang MD  ASSISTANT:  n/a     ANESTHESIA: General endotracheal.     ESTIMATED BLOOD LOSS: 25 ml     FINDINGS:   The appendix was injected, dilated and markedly inflamed.   The appendix had a firm and bulbous tip, the distal 2 cm.  It was not perforated.   There was minimal turbid fluid near the appendix.  The terminal ileum was not involved.  The base of the appendix was not involved.  There were adhesions to the cecum and lateral abdominal wall.     COMPLICATIONS: None.     SPECIMENS: Appendix.     OPERATIVE INDICATIONS: Gladis Dunaway is a 64 year old year old female with a history of right lower quadrant abdominal pain.     CT scan of the abdomen and pelvis suggested appendicitis.      The patient understood the risks and benefits of proceeding with surgery. The patient has an   indication for laparoscopic appendectomy and consented to undergo surgery.     OPERATIVE DETAILS: The patient was brought to the operating room and prepared in a   routine fashion. A timeout was performed prior to surgery and documented by the nursing   team.     Under the benefits of general anesthesia, a left upper quadrant Veress needle was inserted and pneumoperitoneum was established using carbon dioxide gas to a maximum pressure of 15 mm Hg. A total of 3 ports were placed.     The patient was moved into a position with the right side up.     The cecum was identified and the appendix was identified at the confluence of the tenia coli.      The appendix was elevated once the base and tip were clearly visualized and isolated.      Ligasure was used to divide a edematous mesoappendix.      The bowel load endoGIA stapler was then used to divide the appendix at its base.     The appendix was placed into a laparoscopic retrieval bag and removed from the body.     The  abdomen was irrigated with saline and the saline was aspirated from the RLQ.     The terminal ileum was inspected as well as the staple line at the cecum.       Hemostasis was confirmed.     The 12 mm incision was closed using a single 0 Vicryl suture.     All skin incisions were closed using 4-0 vicryl suture and dermabond was applied.

## 2025-03-17 NOTE — ANESTHESIA PREPROCEDURE EVALUATION
Anesthesia Pre-Procedure Evaluation    Patient: Gladis Dunaway   MRN: [id number] : 1960        Procedure : Procedure(s):  APPENDECTOMY, LAPAROSCOPIC          Past Medical History:   Diagnosis Date    Cerebral infarction (H) 2019    Osteoporosis     Perianal abscess 2010      Past Surgical History:   Procedure Laterality Date     SECTION      x2    COLONOSCOPY      DILATION AND CURETTAGE        Allergies   Allergen Reactions    Sulfa Antibiotics Unknown    Penicillins Hives and Rash      Social History     Tobacco Use    Smoking status: Never     Passive exposure: Never    Smokeless tobacco: Never   Substance Use Topics    Alcohol use: Not Currently      Wt Readings from Last 1 Encounters:   25 53 kg (116 lb 12.8 oz)        Anesthesia Evaluation            ROS/MED HX  ENT/Pulmonary:       Neurologic:     (+)          CVA, date: ,                     Cardiovascular:       METS/Exercise Tolerance:     Hematologic:       Musculoskeletal:   (+)  arthritis,             GI/Hepatic:     (+)         appendicitis,           Renal/Genitourinary:       Endo:       Psychiatric/Substance Use:       Infectious Disease:       Malignancy:       Other:            Physical Exam    Airway        Mallampati: II   TM distance: > 3 FB   Neck ROM: full   Mouth opening: > 3 cm    Respiratory Devices and Support         Dental  no notable dental history         Cardiovascular   cardiovascular exam normal          Pulmonary   pulmonary exam normal        breath sounds clear to auscultation           OUTSIDE LABS:  CBC:   Lab Results   Component Value Date    WBC 8.9 2025    WBC 6.9 2024    HGB 12.3 2025    HGB 12.8 2024    HCT 39.0 2025    HCT 40.9 2024     2025     2024     BMP:   Lab Results   Component Value Date     2025     2024    POTASSIUM 4.8 2025    POTASSIUM 4.3 2024    CHLORIDE 104 2025  "   CHLORIDE 103 09/03/2024    CO2 27 03/17/2025    CO2 28 09/03/2024    BUN 11 03/17/2025    BUN 12.4 09/03/2024    CR 0.70 03/17/2025    CR 0.64 09/03/2024    GLC 92 03/17/2025    GLC 87 09/03/2024     COAGS: No results found for: \"PTT\", \"INR\", \"FIBR\"  POC: No results found for: \"BGM\", \"HCG\", \"HCGS\"  HEPATIC: No results found for: \"ALBUMIN\", \"PROTTOTAL\", \"ALT\", \"AST\", \"GGT\", \"ALKPHOS\", \"BILITOTAL\", \"BILIDIRECT\", \"TAYLOR\"  OTHER:   Lab Results   Component Value Date    JAIRON 10.3 (H) 03/17/2025       Anesthesia Plan    ASA Status:  2, emergent    NPO Status:  NPO Appropriate    Anesthesia Type: General.     - Airway: ETT   Induction: Intravenous, RSI.   Maintenance: Balanced.   Techniques and Equipment:     - Airway: Video-Laryngoscope       - Blood: T&S     - Drips/Meds: Phenylephrine     Consents    Anesthesia Plan(s) and associated risks, benefits, and realistic alternatives discussed. Questions answered and patient/representative(s) expressed understanding.     - Discussed:     - Discussed with:  Patient      - Extended Intubation/Ventilatory Support Discussed: No.      - Patient is DNR/DNI Status: No     Use of blood products discussed: Yes.     - Discussed with: Patient.     - Consented: consented to blood products            Reason for refusal: other.     Postoperative Care    Pain management: IV analgesics, Oral pain medications, Multi-modal analgesia.   PONV prophylaxis: Ondansetron (or other 5HT-3), Dexamethasone or Solumedrol, Background Propofol Infusion, Aprepitant     Comments:    Other Comments: Acetaminophen and Aprepitant pre-op           Mike Gu DO    I have reviewed the pertinent notes and labs in the chart from the past 30 days and (re)examined the patient.  Any updates or changes from those notes are reflected in this note.    Clinically Significant Risk Factors Present on Admission                 # Drug Induced Platelet Defect: home medication list includes an antiplatelet medication  "

## 2025-03-18 VITALS
RESPIRATION RATE: 18 BRPM | HEART RATE: 66 BPM | BODY MASS INDEX: 22.09 KG/M2 | DIASTOLIC BLOOD PRESSURE: 58 MMHG | TEMPERATURE: 97.7 F | HEIGHT: 61 IN | OXYGEN SATURATION: 98 % | WEIGHT: 117 LBS | SYSTOLIC BLOOD PRESSURE: 99 MMHG

## 2025-03-18 LAB
BASOPHILS # BLD AUTO: 0 10E3/UL (ref 0–0.2)
BASOPHILS NFR BLD AUTO: 0 %
EOSINOPHIL # BLD AUTO: 0 10E3/UL (ref 0–0.7)
EOSINOPHIL NFR BLD AUTO: 0 %
ERYTHROCYTE [DISTWIDTH] IN BLOOD BY AUTOMATED COUNT: 13.1 % (ref 10–15)
GLUCOSE BLDC GLUCOMTR-MCNC: 98 MG/DL (ref 70–99)
HCT VFR BLD AUTO: 34.4 % (ref 35–47)
HGB BLD-MCNC: 11.4 G/DL (ref 11.7–15.7)
IMM GRANULOCYTES # BLD: 0 10E3/UL
IMM GRANULOCYTES NFR BLD: 0 %
LYMPHOCYTES # BLD AUTO: 0.9 10E3/UL (ref 0.8–5.3)
LYMPHOCYTES NFR BLD AUTO: 13 %
MCH RBC QN AUTO: 30.4 PG (ref 26.5–33)
MCHC RBC AUTO-ENTMCNC: 33.1 G/DL (ref 31.5–36.5)
MCV RBC AUTO: 92 FL (ref 78–100)
MONOCYTES # BLD AUTO: 0.4 10E3/UL (ref 0–1.3)
MONOCYTES NFR BLD AUTO: 6 %
NEUTROPHILS # BLD AUTO: 5.7 10E3/UL (ref 1.6–8.3)
NEUTROPHILS NFR BLD AUTO: 81 %
NRBC # BLD AUTO: 0 10E3/UL
NRBC BLD AUTO-RTO: 0 /100
PLATELET # BLD AUTO: 233 10E3/UL (ref 150–450)
RBC # BLD AUTO: 3.75 10E6/UL (ref 3.8–5.2)
WBC # BLD AUTO: 7 10E3/UL (ref 4–11)

## 2025-03-18 PROCEDURE — 82962 GLUCOSE BLOOD TEST: CPT

## 2025-03-18 PROCEDURE — 85004 AUTOMATED DIFF WBC COUNT: CPT | Performed by: PHYSICIAN ASSISTANT

## 2025-03-18 PROCEDURE — 250N000011 HC RX IP 250 OP 636: Performed by: PHYSICIAN ASSISTANT

## 2025-03-18 PROCEDURE — 250N000013 HC RX MED GY IP 250 OP 250 PS 637: Performed by: PHYSICIAN ASSISTANT

## 2025-03-18 PROCEDURE — 85041 AUTOMATED RBC COUNT: CPT | Performed by: PHYSICIAN ASSISTANT

## 2025-03-18 PROCEDURE — 36415 COLL VENOUS BLD VENIPUNCTURE: CPT | Performed by: PHYSICIAN ASSISTANT

## 2025-03-18 RX ADMIN — OXYCODONE HYDROCHLORIDE 5 MG: 5 TABLET ORAL at 09:13

## 2025-03-18 RX ADMIN — KETOROLAC TROMETHAMINE 15 MG: 15 INJECTION, SOLUTION INTRAMUSCULAR; INTRAVENOUS at 01:13

## 2025-03-18 RX ADMIN — POLYETHYLENE GLYCOL 3350 17 G: 17 POWDER, FOR SOLUTION ORAL at 09:12

## 2025-03-18 RX ADMIN — METRONIDAZOLE 500 MG: 500 INJECTION, SOLUTION INTRAVENOUS at 00:59

## 2025-03-18 RX ADMIN — METRONIDAZOLE 500 MG: 500 INJECTION, SOLUTION INTRAVENOUS at 09:16

## 2025-03-18 RX ADMIN — ACETAMINOPHEN 975 MG: 325 TABLET, FILM COATED ORAL at 09:12

## 2025-03-18 RX ADMIN — ACETAMINOPHEN 975 MG: 325 TABLET, FILM COATED ORAL at 04:05

## 2025-03-18 RX ADMIN — METHOCARBAMOL 500 MG: 500 TABLET ORAL at 09:13

## 2025-03-18 RX ADMIN — CIPROFLOXACIN 400 MG: 2 INJECTION, SOLUTION INTRAVENOUS at 04:05

## 2025-03-18 RX ADMIN — Medication 3 MG: at 01:14

## 2025-03-18 RX ADMIN — KETOROLAC TROMETHAMINE 15 MG: 15 INJECTION, SOLUTION INTRAMUSCULAR; INTRAVENOUS at 09:12

## 2025-03-18 RX ADMIN — SENNOSIDES AND DOCUSATE SODIUM 1 TABLET: 50; 8.6 TABLET ORAL at 09:12

## 2025-03-18 ASSESSMENT — ACTIVITIES OF DAILY LIVING (ADL)
ADLS_ACUITY_SCORE: 55

## 2025-03-18 NOTE — PLAN OF CARE
Pt is AOx4, pain is managed with po pain meds, tolerating regular diet, passing gas, reports shoulder pain, ambulation encouraged, IV removed, discharge instructions reviewed with pt, meds given, supplies given. Pt is discharged home with family.

## 2025-03-18 NOTE — PROGRESS NOTES
"General Surgery Progress Note    Admission Date: 3/17/2025  Today's Date: 3/18/2025         Assessment:      Gladis Dunaway is a 64 year old female with acute appendicitis POD 1 s/p laparoscopic appendectomy         Plan:   Discharge home today. Instructions reviewed, questions answered. Phone call follow-up with surgical team in 2-3 weeks if doing well vs office appointment if preferred  - No further antibiotics  - Tylenol and oxy for pain. Senna-docusate for bowel regimen. If no BM in next 1-2 days, discussed obtaining stronger laxative over the counter   - Tolerating clear liquids, good intake. IV fluids saline locked  - Continue with out of bed, ambulation, wear PCDs while resting, encourage IS use  - Blood pressures low, but at baseline for Gladis. Voiding well with good UOP, no evidence of volume depletion        Interval History:   Afebrile, vitals stable on room air. Blood pressures 90s-low 100s / 50s-60s. Per Gladis, this is her baseline blood pressure. No dizziness or lightheadedness when up ambulating. Drinking a lot of water, hasn't had much to eat yet. Feels hungry, going slowly with diet. Abdomen sore but pain appropriately controlled with meds, heat, ice. Gladis feels ready for discharge today.           Physical Exam:   BP 99/58 (BP Location: Right arm)   Pulse 66   Temp 97.7  F (36.5  C) (Oral)   Resp 18   Ht 1.549 m (5' 1\")   Wt 53.1 kg (117 lb)   LMP  (LMP Unknown)   SpO2 98%   BMI 22.11 kg/m    I/O last 3 completed shifts:  In: 1200 [P.O.:1200]  Out: 425 [Urine:400; Blood:25]  General: NAD, pleasant, alert and oriented x3  Cardiovascular: regular rate and rhythm; S1 and S2 distinct without murmur   Respiratory: lungs clear to auscultation bilaterally without wheezes, rales or rhonchi   Abdomen: soft, appropriately tender around incisions, non distended  Incision: clean, dry, and intact with skin glue in place. No erythema or drainage  Extremities: no lower extremity edema, no calf " tenderness    LABS:  Recent Labs   Lab Test 03/18/25  0751 03/17/25  1349 09/03/24  1432   WBC 7.0 8.9 6.9   HGB 11.4* 12.3 12.8   MCV 92 96 96    315 298      Recent Labs   Lab Test 03/17/25  1349 09/03/24  1432 08/23/23  1142   POTASSIUM 4.8 4.3 4.7   CHLORIDE 104 103 105   CO2 27 28 26   BUN 11 12.4 11.1   CR 0.70 0.64 0.64   ANIONGAP 14 11 10        -------------------------------    Viviana Ley PA-C  [facility]  [phone]

## 2025-03-18 NOTE — DISCHARGE SUMMARY
"Surgery Discharge Summary    Gladis Dunaway MRN# [id number]   YOB: 1960 Age: 64 year old     Date of Admission:  3/17/2025  Date of Discharge:  3/18/2025  Admitting Physician:  Naldo Jiang MD  Discharging Service:  [facility]   Primary Provider: Bailee Sands    Discharge Diagnosis:   Principle Diagnosis:  Acute appendicitis, unspecified acute appendicitis type [K35.80]      Brief HPI: Gladis Dunaway is a 64 year old year-old female who presented to the emergency department with a history of sudden and progressive abdominal pain. Work-up revealed CT findings consistent with acute appendicitis. After pre-op screening, discussing the risks, benefits, and possible complications, an informed consent was obtained to proceed with a laparoscopic appendectomy.      Hospital Course: Gladis Dunaway underwent a laparoscopic appendectomy without complications. Intra-op findings were consistent with grossly nonperforated acute appendicitis. Please see op note for further details. Post-op she was admitted to the floor for monitoring and recovery. She was initiated on a diet and oral pain medications which she tolerated well. Her hospital course remained uncomplicated and she recovered as anticipated. On day of discharge, she was tolerating an oral diet, had good pain control on oral medications, was ambulating independently and remained afebrile thus medically appropriate for discharge. All discharge instructions were thoroughly reviewed, questions answered. She will follow-up with us in two to three weeks via phone or in clinic. She was advised to call with any questions or concerns in the meantime.      Procedures:   Laparoscopic appendectomy       Disposition:   Discharged to home     Discharge Condition  Discharge condition: Stable   Discharge vitals: Blood pressure 99/58, pulse 66, temperature 97.7  F (36.5  C), temperature source Oral, resp. rate 18, height 1.549 m (5' 1\"), weight 53.1 kg (117 " lb), SpO2 98%, not currently breastfeeding.     Discharge Medications:   Current Discharge Medication List        START taking these medications    Details   oxyCODONE (ROXICODONE) 5 MG tablet Take 1-2 tablets (5-10 mg) by mouth every 6 hours as needed for moderate to severe pain.  Qty: 8 tablet, Refills: 0    Associated Diagnoses: Acute appendicitis, unspecified acute appendicitis type      SENNA-docusate sodium (SENNA S) 8.6-50 MG tablet Take 1-2 tablets by mouth 2 times daily as needed (constipation).  Qty: 15 tablet, Refills: 0    Associated Diagnoses: Acute appendicitis, unspecified acute appendicitis type           CONTINUE these medications which have NOT CHANGED    Details   aspirin (ASA) 81 MG chewable tablet Take 81 mg by mouth daily      atorvastatin (LIPITOR) 20 MG tablet Take 1 tablet (20 mg) by mouth daily.  Qty: 90 tablet, Refills: 3    Associated Diagnoses: H/O: CVA (cerebrovascular accident)      multivitamin w/minerals (MULTI-VITAMIN) tablet Take 1 tablet by mouth daily.             Discharge Instructions:   Follow-up Appointments     Follow Up      See attached Surgery Discharge Instruction Sheet for further information.            After Care Instructions       Activity      Expect to feel tired after your surgery.  This will gradually resolve.    Take frequent, short walks and increase your activity gradually.    Avoid strenuous physical activity or heavy lifting greater than 15-20 lbs. for 2-3 weeks.  You may climb stairs.  You may drive without restrictions when you are not using any prescription pain medication and feel comfortable in a car.  You may return to work/school when you are comfortable without any prescription pain medication.        Diet      Start with liquids, then gradually resume your regular diet as tolerated.  Avoid heavy, spicy, and greasy meals for 2-3 days.  Drink plenty of fluids to stay hydrated.        Wound care and dressings      Instructions to care for your wound at  home: keep wound(s) clean and dry. You may shower, but do not soak incisions x 2 weeks. Leave skin glue in place, they will fall off on their own. Apply dry dressing as needed.                    Viviana Ley PA-C   [facility]  [phone]

## 2025-03-18 NOTE — PHARMACY-ADMISSION MEDICATION HISTORY
Pharmacist Admission Medication History    Admission medication history is complete. The information provided in this note is only as accurate as the sources available at the time of the update.    Information Source(s): Patient and CareEverywhere/SureScripts via in-person    Pertinent Information: none    Changes made to PTA medication list:  Added: MVI  Deleted: None  Changed: None    Allergies reviewed with patient and updates made in EHR: no    Medication History Completed By: Terri Hoskins CAT 3/17/2025 9:49 PM    PTA Med List   Medication Sig Last Dose/Taking    aspirin (ASA) 81 MG chewable tablet Take 81 mg by mouth daily 3/17/2025 Morning    atorvastatin (LIPITOR) 20 MG tablet Take 1 tablet (20 mg) by mouth daily. 3/16/2025 Evening    multivitamin w/minerals (MULTI-VITAMIN) tablet Take 1 tablet by mouth daily. 3/16/2025 Morning    oxyCODONE (ROXICODONE) 5 MG tablet Take 1-2 tablets (5-10 mg) by mouth every 6 hours as needed for moderate to severe pain. Taking As Needed    SENNA-docusate sodium (SENNA S) 8.6-50 MG tablet Take 1-2 tablets by mouth 2 times daily as needed (constipation). Taking As Needed

## 2025-03-18 NOTE — PLAN OF CARE
Goal Outcome Evaluation:      Plan of Care Reviewed With: patient    Overall Patient Progress: improvingOverall Patient Progress: improving         Date & Time: 3/18/2025    9p-7a  Surgery/POD#: 1 APPENDECTOMY, LAPAROSCOPIC  Behavior & Aggression:  Green, tearful, sad   Fall Risk: Yes  Orientation:AOx4  ABNL VS/O2:VSS on RA ex soft BP  ABNL Labs: see labs  Pain Management:Tylenol, Toradol  Bowel/Bladder: Voiding in BR, +gas, +BS, -BM  Drains: PIV SL  Wounds/incisions: Abd port sites CDI  Diet:Clear. Tolerated   Activity Level: SBA  Tests/Procedures: N/A  Anticipated  DC Date: 3/18/2025   Significant Information: Walked in hallways x1. Pain controlled. Int ABx

## 2025-03-18 NOTE — PROGRESS NOTES
Patient VSS are at baseline  YES    Patient able to ambulate as they were prior to admission or with assist devices provided by therapies during their stay YES    Patient able to tolerate oral intake and maintain hydration status  YES    Patient has adequate pain control using oral analgesics YES    Patient must be voiding adequate amounts YES    Hypercapnia, Hypoventilation or hypoxia resolved for at least 2 hours without supplemental oxygen YES    Deficits in sensation, mobility or coordination have resolved if spinal or regional anesthesia used YES    Patient has returned to baseline mental status YES

## 2025-03-18 NOTE — ANESTHESIA POSTPROCEDURE EVALUATION
Patient: Gladis Dunaway    Procedure: Procedure(s):  APPENDECTOMY, LAPAROSCOPIC       Anesthesia Type:  General    Note:     Postop Pain Control: Uneventful            Sign Out: Well controlled pain   PONV: No   Neuro/Psych: Uneventful            Sign Out: Acceptable/Baseline neuro status   Airway/Respiratory: Uneventful            Sign Out: Acceptable/Baseline resp. status   CV/Hemodynamics: Uneventful            Sign Out: Acceptable CV status; No obvious hypovolemia; No obvious fluid overload   Other NRE: NONE   DID A NON-ROUTINE EVENT OCCUR? No    Event details/Postop Comments:  Patient with subcutaneous emphysema to clavicles.  Trachea midline, no  airway compromise. Discussed with patient.             Last vitals:  Vitals Value Taken Time   BP 93/63 03/17/25 2000   Temp 36  C (96.8  F) 03/17/25 2000   Pulse 93 03/17/25 2000   Resp 8 03/17/25 2000   SpO2 91 % 03/17/25 2000   Vitals shown include unfiled device data.    Electronically Signed By: Rudy Beckham MD  March 17, 2025  8:36 PM

## 2025-03-22 ENCOUNTER — TELEPHONE (OUTPATIENT)
Dept: CARDIOLOGY | Facility: CLINIC | Age: 65
End: 2025-03-22
Payer: MEDICARE

## 2025-03-25 LAB
PATH REPORT.COMMENTS IMP SPEC: NORMAL
PATH REPORT.COMMENTS IMP SPEC: NORMAL
PATH REPORT.FINAL DX SPEC: NORMAL
PATH REPORT.GROSS SPEC: NORMAL
PATH REPORT.MICROSCOPIC SPEC OTHER STN: NORMAL
PATH REPORT.RELEVANT HX SPEC: NORMAL
PHOTO IMAGE: NORMAL

## 2025-03-25 PROCEDURE — 88304 TISSUE EXAM BY PATHOLOGIST: CPT | Mod: 26

## 2025-08-04 ENCOUNTER — PATIENT OUTREACH (OUTPATIENT)
Dept: CARE COORDINATION | Facility: CLINIC | Age: 65
End: 2025-08-04
Payer: MEDICARE

## (undated) DEVICE — NDL INSUFFLATION 13GA 120MM C2201

## (undated) DEVICE — GLOVE BIOGEL PI MICRO INDICATOR UNDERGLOVE SZ 6.5 48965

## (undated) DEVICE — CATH TRAY FOLEY SURESTEP 16FR WDRAIN BAG STLK LATEX A300316A

## (undated) DEVICE — PACK LAP CHOLE SLC15LCFSD

## (undated) DEVICE — SUCTION LAPAROSCOPIC SMOKE EVAC SYSTEM SEL7000A

## (undated) DEVICE — BAG DECANTER STERILE WHITE DYNJDEC09

## (undated) DEVICE — SOL WATER IRRIG 1000ML BOTTLE 2F7114

## (undated) DEVICE — ESU LIGASURE MARYLAND LAPAROSCOPIC SLR/DVDR 5MMX37CM LF1937

## (undated) DEVICE — SU VICRYL+ 0 27 UR6 VLT VCP603H

## (undated) DEVICE — BLADE KNIFE SURG 11 371111

## (undated) DEVICE — ENDO TROCAR FIRST ENTRY KII FIOS Z-THRD 12X100MM CTF73

## (undated) DEVICE — ESU HOLDER LAP INST DISP PURPLE LONG 330MM H-PRO-330

## (undated) DEVICE — SU VICRYL 4-0 PS-2 18" UND J496H

## (undated) DEVICE — ENDO TROCAR SLEEVE KII Z-THREADED 05X100MM CTS02

## (undated) DEVICE — LINEN TOWEL PACK X5 5464

## (undated) DEVICE — ENDO SCOPE WARMER LF TM500

## (undated) DEVICE — PREP CHLORAPREP 26ML TINTED HI-LITE ORANGE 930815

## (undated) DEVICE — ENDO TROCAR FIRST ENTRY KII FIOS Z-THRD 05X100MM CTF03

## (undated) DEVICE — ESU GROUND PAD UNIVERSAL W/O CORD

## (undated) DEVICE — SUCTION MANIFOLD NEPTUNE 2 SYS 4 PORT 0702-020-000

## (undated) DEVICE — CATH FOLEY 14FR 30ML LATEX 0166SI14

## (undated) DEVICE — DEVICE SUTURE PASSER 14GA WECK EFX EFXSP2

## (undated) DEVICE — SOL NACL 0.9% INJ 1000ML BAG 2B1324X

## (undated) DEVICE — ENDO POUCH UNIV RETRIEVAL SYSTEM INZII 10MM CD001

## (undated) DEVICE — STPL POWERED ECHELON 45MM PSEE45A

## (undated) DEVICE — ENDO SHEARS 5MM 176643

## (undated) RX ORDER — PROPOFOL 10 MG/ML
INJECTION, EMULSION INTRAVENOUS
Status: DISPENSED
Start: 2025-03-17

## (undated) RX ORDER — CIPROFLOXACIN 2 MG/ML
INJECTION, SOLUTION INTRAVENOUS
Status: DISPENSED
Start: 2025-03-17

## (undated) RX ORDER — CEFAZOLIN SODIUM/WATER 2 G/20 ML
SYRINGE (ML) INTRAVENOUS
Status: DISPENSED
Start: 2025-03-17

## (undated) RX ORDER — ONDANSETRON 2 MG/ML
INJECTION INTRAMUSCULAR; INTRAVENOUS
Status: DISPENSED
Start: 2025-03-17

## (undated) RX ORDER — METRONIDAZOLE 500 MG/100ML
INJECTION, SOLUTION INTRAVENOUS
Status: DISPENSED
Start: 2025-03-17

## (undated) RX ORDER — ACETAMINOPHEN 325 MG/1
TABLET ORAL
Status: DISPENSED
Start: 2025-03-17

## (undated) RX ORDER — APREPITANT 40 MG/1
CAPSULE ORAL
Status: DISPENSED
Start: 2025-03-17

## (undated) RX ORDER — DEXAMETHASONE SODIUM PHOSPHATE 4 MG/ML
INJECTION, SOLUTION INTRA-ARTICULAR; INTRALESIONAL; INTRAMUSCULAR; INTRAVENOUS; SOFT TISSUE
Status: DISPENSED
Start: 2025-03-17

## (undated) RX ORDER — HYDROMORPHONE HYDROCHLORIDE 1 MG/ML
INJECTION, SOLUTION INTRAMUSCULAR; INTRAVENOUS; SUBCUTANEOUS
Status: DISPENSED
Start: 2025-03-17

## (undated) RX ORDER — BUPIVACAINE HYDROCHLORIDE AND EPINEPHRINE 5; 5 MG/ML; UG/ML
INJECTION, SOLUTION EPIDURAL; INTRACAUDAL; PERINEURAL
Status: DISPENSED
Start: 2025-03-17

## (undated) RX ORDER — FENTANYL CITRATE 50 UG/ML
INJECTION, SOLUTION INTRAMUSCULAR; INTRAVENOUS
Status: DISPENSED
Start: 2025-03-17